# Patient Record
Sex: FEMALE | Race: WHITE | Employment: OTHER | URBAN - METROPOLITAN AREA
[De-identification: names, ages, dates, MRNs, and addresses within clinical notes are randomized per-mention and may not be internally consistent; named-entity substitution may affect disease eponyms.]

---

## 2017-01-19 RX ORDER — BUPROPION HYDROCHLORIDE 300 MG/1
300 TABLET ORAL
Qty: 90 TAB | Refills: 1 | Status: SHIPPED | OUTPATIENT
Start: 2017-01-19 | End: 2017-08-07 | Stop reason: SDUPTHER

## 2017-01-19 NOTE — TELEPHONE ENCOUNTER
63year old patient last AE on 08/08/16 calling to see if her prescription for Wellbutrin can be sent to [facility] delivery. Will send prescription per MD order.

## 2017-03-27 ENCOUNTER — TELEPHONE (OUTPATIENT)
Dept: OBGYN CLINIC | Age: 62
End: 2017-03-27

## 2017-03-27 RX ORDER — ESTRADIOL AND NORETHINDRONE ACETATE 1; .5 MG/1; MG/1
TABLET ORAL
Qty: 84 TAB | Refills: 3 | Status: SHIPPED | OUTPATIENT
Start: 2017-03-27 | End: 2017-08-21 | Stop reason: SDUPTHER

## 2017-05-10 ENCOUNTER — TELEPHONE (OUTPATIENT)
Dept: OBGYN CLINIC | Age: 62
End: 2017-05-10

## 2017-05-10 RX ORDER — ESTRADIOL 0.1 MG/G
CREAM VAGINAL
Qty: 42.5 G | Refills: 2 | Status: SHIPPED | OUTPATIENT
Start: 2017-05-10 | End: 2017-05-12 | Stop reason: SDUPTHER

## 2017-05-10 RX ORDER — HALOBETASOL PROPIONATE 0.5 MG/G
CREAM TOPICAL 2 TIMES DAILY
Qty: 50 G | Refills: 3 | Status: SHIPPED | OUTPATIENT
Start: 2017-05-10 | End: 2018-05-22

## 2017-05-10 NOTE — TELEPHONE ENCOUNTER
Patient called requesting a refill on the Halobetasol and Estrace until her AE 8/2017. Requested the Estrace to be sent to the [facility] pharmacy and the [facility] sent to [facility] mail order.

## 2017-05-12 RX ORDER — ESTRADIOL 0.1 MG/G
CREAM VAGINAL
Qty: 42.5 G | Refills: 2 | Status: SHIPPED | OUTPATIENT
Start: 2017-05-12 | End: 2018-05-22

## 2017-05-12 NOTE — TELEPHONE ENCOUNTER
Call received at 9:39am      63year old patient last seen in the office on 8/8/16 for AE. Patient calling regarding her prescription sent for Estrace 0.01% vaginal cream. Patient was advised that prescription was sent to [address]. Patient would like the prescription resent to [facility] preferred [facility] [address] . This nurse confirmed prescription for Ultavate sent to mail order pharmacy as requested and patient advised that she has already received notification that it is being processed. Prescription for Estrace resent as per MD order to patient preferred pharmacy. Patient verbalized understanding

## 2017-06-06 ENCOUNTER — OFFICE VISIT (OUTPATIENT)
Dept: OBGYN CLINIC | Age: 62
End: 2017-06-06

## 2017-06-06 VITALS
BODY MASS INDEX: 25.07 KG/M2 | DIASTOLIC BLOOD PRESSURE: 84 MMHG | WEIGHT: 156 LBS | SYSTOLIC BLOOD PRESSURE: 140 MMHG | HEIGHT: 66 IN

## 2017-06-06 DIAGNOSIS — N95.1 MENOPAUSAL SYNDROME: ICD-10-CM

## 2017-06-06 DIAGNOSIS — L90.0 LICHEN SCLEROSUS ET ATROPHICUS: ICD-10-CM

## 2017-06-06 DIAGNOSIS — N95.2 VAGINAL ATROPHY: ICD-10-CM

## 2017-06-06 DIAGNOSIS — N76.0 VAGINITIS AND VULVOVAGINITIS: Primary | ICD-10-CM

## 2017-06-06 RX ORDER — FLUCONAZOLE 200 MG/1
200 TABLET ORAL
Qty: 6 TAB | Refills: 1 | Status: SHIPPED | OUTPATIENT
Start: 2017-06-06 | End: 2017-06-11

## 2017-06-06 NOTE — PROGRESS NOTES
Vaginitis evaluation    Chief Complaint   Vaginal Itching      HPI  63 y.o. female complains of itching,irritation and burnging for several days. Patient's last menstrual period was 10/18/2007. She denies additional symptoms at this time. The patient denies aggravating factors. She is not concerned about possible STI exposure at this time. She denies exposure to new chemicals ot hygenic agents except Zithromax about 6-8 weeks ago.   Previous treatment included: none    Past Medical History:   Diagnosis Date    Atypical ductal hyperplasia of breast 2012    Atypical ductal hyperplasia of breast     Colitis     Depression     GERD (gastroesophageal reflux disease) past week    reflux only occasionally    Hormone replacement therapy     Hx of bone density study 2008    Normal per pt    Hypertension     IBS (irritable bowel syndrome)     Lichen sclerosus et atrophicus of the vulva     Menopausal syndrome     Neck pain, chronic     Other ill-defined conditions     old neck injury/pain comes & goes    Other ill-defined conditions     seasonal allergies    Psychiatric disorder     depression    Seasonal allergies     Unspecified adverse effect of anesthesia      hard time waking up with one SX    Unspecified adverse effect of anesthesia     C/O \"waking up\" during one SX    Urinary incontinence      Past Surgical History:   Procedure Laterality Date    BIOPSY OF BREAST, INCISIONAL  9/2012    RIGHT for ADH    BREAST SURGERY PROCEDURE UNLISTED  6/13/11    RIGHT ductal excision    HX CATARACT REMOVAL      2005, 2010    HX GYN  1994    PROCEDURE FOR LICHEN SCLEROSIS    HX GYN      Vulvoplasty    HX HEENT      LASIK    HX HEENT      Eye surgery for lazy eyes    HX HYSTEROSCOPY WITH ENDOMETRIAL ABLATION  2007    w/ D&C    HX ORTHOPAEDIC  1988    Arthroscopy of the knee LEFT    HX ORTHOPAEDIC  3/2011    Bone spur RIGHT FOOT    HX TONSILLECTOMY  1963    HX UROLOGICAL  10/25/13    Urodynamics Social History     Occupational History    Not on file. Social History Main Topics    Smoking status: Never Smoker    Smokeless tobacco: Never Used    Alcohol use 2.0 oz/week     2 Glasses of wine, 2 Cans of beer per week      Comment: does not drink every day just occ    Drug use: No    Sexual activity: Not Currently     Birth control/ protection: None     Family History   Problem Relation Age of Onset    Breast Cancer Maternal Grandmother 36      of \"bone cancer\" many yrs later   [facility] Cancer Mother      LIVER    Headache Mother     Heart Disease Father     Osteoporosis Sister     Uterine Cancer Other      Aunt        Allergies   Allergen Reactions    Demerol [Meperidine] Other (comments)     Arm swelling     Prior to Admission medications    Medication Sig Start Date End Date Taking? Authorizing Provider   estradiol (ESTRACE) 0.01 % (0.1 mg/gram) vaginal cream Insert 0.5g into vagina daily. 17   Sandra Randle MD   halobetasol (ULTRAVATE) 0.05 % topical cream Apply  to affected area two (2) times a day. use thin layer 5/10/17   Sandra Randle MD   estradiol-norethindrone [facility]) 1-0.5 mg per tablet TAKE 1 TABLET BY MOUTH DAILY 3/27/17   Sandra Randle MD   buPROPion XL (WELLBUTRIN XL) 300 mg XL tablet Take 1 Tab by mouth every morning. 17   Sandra Randle MD   conjugated estrogens (PREMARIN) 0.625 mg/gram vaginal cream Insert 0.5 g into vagina daily. 16   Sandra Randle MD   halobetasol (ULTRAVATE) 0.05 % topical cream use thin layer twice a day 16   Sandra Randle MD   conjugated estrogens (PREMARIN) 0.625 mg/gram vaginal cream Insert 0.5 grams vaginally twice a week 16   Sandra Randle MD   VITAMIN D2 50,000 unit capsule  5/21/15   Historical Provider   mirabegron ER (MYRBETRIQ) 50 mg ER tablet Take 1 Tab by mouth daily. 8/17/15   Sandra Randle MD   solifenacin (VESICARE) 10 mg tablet Take 1 Tab by mouth daily.  14   Sandra Randle MD   tolterodine ER (DETROL LA) 4 mg ER capsule Take 1 Cap by mouth daily. 6/2/14   Juanita Aragon MD   FLUTICASONE PROPIONATE, BULK, by Does Not Apply route. Historical Provider   amoxicillin (AMOXIL) 875 mg tablet Take 875 mg by mouth two (2) times a day. Historical Provider   guaiFENesin-codeine (CHERATUSSIN AC)  mg/5 mL solution Take 5 mL by mouth three (3) times daily as needed for Cough. Historical Provider   HYDROcodone-acetaminophen (LORTAB 7.5) 7.5-500 mg per tablet Take 1 Tab by mouth every four (4) hours as needed for Pain. 12/19/13   Juanita Aragon MD   cetirizine (ZYRTEC) 10 mg tablet Take 10 mg by mouth nightly. Historical Provider   amLODIPine (NORVASC) 5 mg tablet Take 5 mg by mouth daily. Historical Provider   dicyclomine (BENTYL) 20 mg tablet Take 20 mg by mouth three (3) times daily as needed. Historical Provider   cyclobenzaprine (FLEXERIL) 10 mg tablet Take 10 mg by mouth three (3) times daily as needed.       Historical Provider                      Review of Systems - History obtained from the patient  Constitutional: negative for weight loss, fever, night sweats  Breast: negative for breast lumps, nipple discharge, galactorrhea  GI: negative for change in bowel habits, abdominal pain, black or bloody stools  : negative for frequency, dysuria, hematuria  MSK: negative for back pain, joint pain, muscle pain  Skin: negative for itching, rash, hives  Neuro: negative for dizziness, headache, confusion, weakness  Psych: negative for anxiety, depression, change in mood  Heme/lymph: negative for bleeding, bruising, pallor       Objective:    Visit Vitals    /84    Ht 5' 6\" (1.676 m)    Wt 156 lb (70.8 kg)    LMP 10/18/2007    BMI 25.18 kg/m2       Physical Exam:   PHYSICAL EXAMINATION    Constitutional  · Appearance: well-nourished, well developed, alert, in no acute distress    HENT  · Head and Face: appears normal    Genitourinary  · External Genitalia: scarred from previous surgery with some LSA changes and some atrophy, possibly worse geovanni-clitoral, some discharge present and adherent to the vulva, no tenderness present, no other inflammatory lesions present  · Vagina:  Some white discharge present, otherwise normal vaginal vault without central or paravaginal defects, no inflammatory lesions present, no masses present  · Bladder: non-tender to palpation  · Urethra: appears normal  · Perineum: scarred but no rashes or skin lesions present  · Anus: anus within normal limits, no hemorrhoids present  · Inguinal Lymph Nodes: no lymphadenopathy present    Skin  · General Inspection: no rash, no lesions identified    Neurologic/Psychiatric  · Mental Status:  · Orientation: grossly oriented to person, place and time  · Mood and Affect: mood normal, affect appropriate       . No results found for any visits on 06/06/17. Assessment:   Suspect monilia vaginitis in addition to LSA and atrophy    Plan:   Treatment: Diflucan every other day for 6 doses. Call results of NS    ROV prn if symptoms persist or worsen.

## 2017-06-09 LAB
A VAGINAE DNA VAG QL NAA+PROBE: ABNORMAL SCORE
BVAB2 DNA VAG QL NAA+PROBE: ABNORMAL SCORE
C ALBICANS DNA VAG QL NAA+PROBE: POSITIVE
C GLABRATA DNA VAG QL NAA+PROBE: NEGATIVE
MEGA1 DNA VAG QL NAA+PROBE: ABNORMAL SCORE

## 2017-08-15 ENCOUNTER — TELEPHONE (OUTPATIENT)
Dept: OBGYN CLINIC | Age: 62
End: 2017-08-15

## 2017-08-15 RX ORDER — BUPROPION HYDROCHLORIDE 300 MG/1
300 TABLET ORAL
Qty: 30 TAB | Refills: 0 | Status: SHIPPED | OUTPATIENT
Start: 2017-08-15

## 2017-08-15 NOTE — TELEPHONE ENCOUNTER
Patient calling stating that a 30 day supply was sent to her mail order pharmacy and they will not refill the medication unless its a 90 day supply. Patient asked that since her AE scheduled for 08/21/2017, could she get a 30 day supply to a local pharmacy just to get her to her AE and then a 90 day supply to her mail order pharmacy when she comes in for her appt. Patient aware that this nurse sent a 30 day supply to her local pharmacy.

## 2017-08-21 ENCOUNTER — OFFICE VISIT (OUTPATIENT)
Dept: OBGYN CLINIC | Age: 62
End: 2017-08-21

## 2017-08-21 VITALS
SYSTOLIC BLOOD PRESSURE: 134 MMHG | HEIGHT: 66 IN | BODY MASS INDEX: 26.2 KG/M2 | WEIGHT: 163 LBS | DIASTOLIC BLOOD PRESSURE: 82 MMHG

## 2017-08-21 DIAGNOSIS — Z79.890 HORMONE REPLACEMENT THERAPY: ICD-10-CM

## 2017-08-21 DIAGNOSIS — R39.15 URINARY URGENCY: ICD-10-CM

## 2017-08-21 DIAGNOSIS — N95.1 MENOPAUSAL SYNDROME: Primary | ICD-10-CM

## 2017-08-21 DIAGNOSIS — L90.0 LICHEN SCLEROSUS ET ATROPHICUS: ICD-10-CM

## 2017-08-21 DIAGNOSIS — Z01.411 ENCOUNTER FOR GYNECOLOGICAL EXAMINATION WITH ABNORMAL FINDING: ICD-10-CM

## 2017-08-21 RX ORDER — ESTRADIOL AND NORETHINDRONE ACETATE 1; .5 MG/1; MG/1
TABLET ORAL
Qty: 168 TAB | Refills: 3 | Status: SHIPPED | OUTPATIENT
Start: 2017-08-21 | End: 2017-10-31 | Stop reason: SDUPTHER

## 2017-08-21 NOTE — PROGRESS NOTES
Annual exam/Problem visit/ ages 40-58    Deven Glynn is a ,  63 y.o. female [facility] Patient's last menstrual period was 10/18/2007. .    She presents for her annual checkup. She is having some significant problems. With regard to the Gardasil vaccine, she is older than the age for which it is FDA approved. Menstrual status:    Her periods are absent in flow. She is using essentially none pads or tampons per day, nonexistant due to post menopausal..    She denies dysmenorrhea. She reports no premenstrual symptoms. Contraception:    The current method of family planning is post menopausal status. Sexual history:    She  reports that she does not currently engage in sexual activity. She reports using the following method of birth control/protection: None. Medical conditions:    Since her last annual GYN exam about one year ago, she has not the following changes in her health history: none. Pap and Mammogram History:    Her most recent Pap smear was normal, obtained 1 year(s) ago. The patient had her mammogram today in our office. Breast Cancer History/Substance Abuse: negative    Osteoporosis History:    Family history does not include a first or second degree relative with osteopenia or osteoporosis. A bone density scan was obtained 9 years ago and revealed negative. She is currently taking calcium and vit D.     Past Medical History:   Diagnosis Date    Atypical ductal hyperplasia of breast 2012    Atypical ductal hyperplasia of breast     Colitis     Depression     GERD (gastroesophageal reflux disease) past week    reflux only occasionally    Hormone replacement therapy     Hx of bone density study     Normal per pt    Hypertension     IBS (irritable bowel syndrome)     Lichen sclerosus et atrophicus of the vulva     Menopausal syndrome     Neck pain, chronic     Other ill-defined conditions     old neck injury/pain comes & goes   [place] ill-defined conditions     seasonal allergies    Psychiatric disorder     depression    Seasonal allergies     Unspecified adverse effect of anesthesia      hard time waking up with one SX    Unspecified adverse effect of anesthesia     C/O \"waking up\" during one SX    Urinary incontinence      Past Surgical History:   Procedure Laterality Date    BIOPSY OF BREAST, INCISIONAL  9/2012    RIGHT for ADH    BREAST SURGERY PROCEDURE UNLISTED  6/13/11    RIGHT ductal excision    HX CATARACT REMOVAL      2005, 2010    HX GYN  1994    PROCEDURE FOR LICHEN SCLEROSIS    HX GYN      Vulvoplasty    HX HEENT      LASIK    HX HEENT      Eye surgery for lazy eyes    HX HYSTEROSCOPY WITH ENDOMETRIAL ABLATION  2007    w/ D&C    HX ORTHOPAEDIC  1988    Arthroscopy of the knee LEFT    HX ORTHOPAEDIC  3/2011    Bone spur RIGHT FOOT    HX TONSILLECTOMY  1963    HX UROLOGICAL  10/25/13    Urodynamics       Current Outpatient Prescriptions   Medication Sig Dispense Refill    buPROPion XL (WELLBUTRIN XL) 300 mg XL tablet Take 1 Tab by mouth every morning. 30 Tab 0    estradiol-norethindrone (LOPREEZA) 1-0.5 mg per tablet TAKE 1 TABLET BY MOUTH DAILY 84 Tab 3    conjugated estrogens (PREMARIN) 0.625 mg/gram vaginal cream Insert 0.5 g into vagina daily. 45 g 0    conjugated estrogens (PREMARIN) 0.625 mg/gram vaginal cream Insert 0.5 grams vaginally twice a week 45 g 3    mirabegron ER (MYRBETRIQ) 50 mg ER tablet Take 1 Tab by mouth daily. 90 Tab 3    tolterodine ER (DETROL LA) 4 mg ER capsule Take 1 Cap by mouth daily. 30 Cap 3    amLODIPine (NORVASC) 5 mg tablet Take 5 mg by mouth daily.  cyclobenzaprine (FLEXERIL) 10 mg tablet Take 10 mg by mouth three (3) times daily as needed.  estradiol (ESTRACE) 0.01 % (0.1 mg/gram) vaginal cream Insert 0.5g into vagina daily. 42.5 g 2    halobetasol (ULTRAVATE) 0.05 % topical cream Apply  to affected area two (2) times a day.  use thin layer 50 g 3    halobetasol (ULTRAVATE) 0.05 % topical cream use thin layer twice a day 50 g 3    VITAMIN D2 50,000 unit capsule       solifenacin (VESICARE) 10 mg tablet Take 1 Tab by mouth daily. 90 Tab 3    FLUTICASONE PROPIONATE, BULK, by Does Not Apply route.  amoxicillin (AMOXIL) 875 mg tablet Take 875 mg by mouth two (2) times a day.  guaiFENesin-codeine (CHERATUSSIN AC)  mg/5 mL solution Take 5 mL by mouth three (3) times daily as needed for Cough.  HYDROcodone-acetaminophen (LORTAB 7.5) 7.5-500 mg per tablet Take 1 Tab by mouth every four (4) hours as needed for Pain. 16 Tab 0    cetirizine (ZYRTEC) 10 mg tablet Take 10 mg by mouth nightly.  dicyclomine (BENTYL) 20 mg tablet Take 20 mg by mouth three (3) times daily as needed. Allergies: Demerol [meperidine]     Tobacco History:  reports that she has never smoked. She has never used smokeless tobacco.  Alcohol Abuse:  reports that she drinks about 2.0 oz of alcohol per week   Drug Abuse:  reports that she does not use illicit drugs.     Family Medical/Cancer History:   Family History   Problem Relation Age of Onset    Breast Cancer Maternal Grandmother 36      of \"bone cancer\" many yrs later   [employer] Cancer Mother      LIVER    Headache Mother     Heart Disease Father     Osteoporosis Sister     Uterine Cancer Other      Aunt        Review of Systems - History obtained from the patient  Constitutional: negative for weight loss, fever, night sweats  HEENT: negative for hearing loss, earache, congestion, snoring, sorethroat  CV: negative for chest pain, palpitations, edema  Resp: negative for cough, shortness of breath, wheezing  GI: negative for change in bowel habits, abdominal pain, black or bloody stools  : negative for frequency, dysuria, hematuria, vaginal discharge  MSK: negative for back pain, joint pain, muscle pain  Breast: negative for breast lumps, nipple discharge, galactorrhea  Skin :negative for itching, rash, hives  Neuro: negative for dizziness, headache, confusion, weakness  Psych: negative for anxiety, depression, change in mood  Heme/lymph: negative for bleeding, bruising, pallor    Physical Exam    Visit Vitals    /82    Ht 5' 6\" (1.676 m)    Wt 163 lb (73.9 kg)    LMP 10/18/2007    BMI 26.31 kg/m2       Constitutional  · Appearance: well-nourished, well developed, alert, in no acute distress    HENT  · Head and Face: appears normal    Neck  · Inspection/Palpation: normal appearance, no masses or tenderness  · Lymph Nodes: no lymphadenopathy present  · Thyroid: gland size normal, nontender, no nodules or masses present on palpation    Chest  · Respiratory Effort: breathing unlabored  · Auscultation: normal breath sounds    Cardiovascular  · Heart:  · Auscultation: regular rate and rhythm without murmur    Breasts  · Inspection of Breasts: breasts symmetrical, no skin changes, no discharge present, nipple appearance normal, no skin retraction present  · Palpation of Breasts and Axillae: no masses present on palpation, no breast tenderness  · Axillary Lymph Nodes: no lymphadenopathy present    Gastrointestinal  · Abdominal Examination: abdomen non-tender to palpation, normal bowel sounds, no masses present  · Liver and spleen: no hepatomegaly present, spleen not palpable  · Hernias: no hernias identified    Genitourinary  · External Genitalia: unchanged from previous exam  · Vagina: normal vaginal vault without central or paravaginal defects, no discharge present, no inflammatory lesions present, no masses present  · Bladder: non-tender to palpation  · Urethra: appears normal  · Cervix: normal   · Uterus: normal size, shape and consistency  · Adnexa: no adnexal tenderness present, no adnexal masses present  · Perineum: perineum within normal limits, no evidence of trauma, no rashes or skin lesions present  · Anus: anus within normal limits, no hemorrhoids present  · Inguinal Lymph Nodes: no lymphadenopathy present    Skin  · General Inspection: no rash, no lesions identified    Neurologic/Psychiatric  · Mental Status:  · Orientation: grossly oriented to person, place and time  · Mood and Affect: mood normal, affect appropriate    Assessment:  Routine gynecologic examination  Her overall medical status is satisfactory except for gynecologic issues as below. Plan:  Counseled re: diet, exercise, healthy lifestyle  Return for yearly wellness visits  Rec annual mammogram    Problem visit    Subjective:  Hot flashes on ERT. Oral combination not working as well as it was (generic)  Bladder frequency better with Myrbetriq, not normal.  But has high fluid intake. Depression on two antidepressants with no sex drive. Xanax for insomnia. Objective:  See above    Assessment:  Vasomotor symptoms--will try doubling her ERT  Bladder stable  Sexual dysfunction    Plan:  Refill ERT with 2 per day.

## 2017-08-21 NOTE — PATIENT INSTRUCTIONS
Well Visit, Women 48 to 72: Care Instructions  Your Care Instructions  Physical exams can help you stay healthy. Your doctor has checked your overall health and may have suggested ways to take good care of yourself. He or she also may have recommended tests. At home, you can help prevent illness with healthy eating, regular exercise, and other steps. Follow-up care is a key part of your treatment and safety. Be sure to make and go to all appointments, and call your doctor if you are having problems. It's also a good idea to know your test results and keep a list of the medicines you take. How can you care for yourself at home? · Reach and stay at a healthy weight. This will lower your risk for many problems, such as obesity, diabetes, heart disease, and high blood pressure. · Get at least 30 minutes of exercise on most days of the week. Walking is a good choice. You also may want to do other activities, such as running, swimming, cycling, or playing tennis or team sports. · Do not smoke. Smoking can make health problems worse. If you need help quitting, talk to your doctor about stop-smoking programs and medicines. These can increase your chances of quitting for good. · Protect your skin from too much sun. When you're outdoors from 10 a.m. to 4 p.m., stay in the shade or cover up with clothing and a hat with a wide brim. Wear sunglasses that block UV rays. Even when it's cloudy, put broad-spectrum sunscreen (SPF 30 or higher) on any exposed skin. · See a dentist one or two times a year for checkups and to have your teeth cleaned. · Wear a seat belt in the car. · Limit alcohol to 1 drink a day. Too much alcohol can cause health problems. Follow your doctor's advice about when to have certain tests. These tests can spot problems early. · Cholesterol.  Your doctor will tell you how often to have this done based on your age, family history, or other things that can increase your risk for heart attack and stroke. · Blood pressure. Have your blood pressure checked during a routine doctor visit. Your doctor will tell you how often to check your blood pressure based on your age, your blood pressure results, and other factors. · Mammogram. Ask your doctor how often you should have a mammogram, which is an X-ray of your breasts. A mammogram can spot breast cancer before it can be felt and when it is easiest to treat. · Pap test and pelvic exam. Ask your doctor how often you should have a Pap test. You may not need to have a Pap test as often as you used to. · Vision. Have your eyes checked every year or two or as often as your doctor suggests. Some experts recommend that you have yearly exams for glaucoma and other age-related eye problems starting at age 48. · Hearing. Tell your doctor if you notice any change in your hearing. You can have tests to find out how well you hear. · Diabetes. Ask your doctor whether you should have tests for diabetes. · Colon cancer. You should begin tests for colon cancer at age 48. You may have one of several tests. Your doctor will tell you how often to have tests based on your age and risk. Risks include whether you already had a precancerous polyp removed from your colon or whether your parents, sisters and brothers, or children have had colon cancer. · Thyroid disease. Talk to your doctor about whether to have your thyroid checked as part of a regular physical exam. Women have an increased chance of a thyroid problem. · Osteoporosis. You should begin tests for bone density at age 72. If you are younger than 72, ask your doctor whether you have factors that may increase your risk for this disease. You may want to have this test before age 72. · Heart attack and stroke risk. At least every 4 to 6 years, you should have your risk for heart attack and stroke assessed.  Your doctor uses factors such as your age, blood pressure, cholesterol, and whether you smoke or have diabetes to show what your risk for a heart attack or stroke is over the next 10 years. When should you call for help? Watch closely for changes in your health, and be sure to contact your doctor if you have any problems or symptoms that concern you. Where can you learn more? Go to http://sena-ramya.info/. Enter S063 in the search box to learn more about \"Well Visit, Women 50 to 72: Care Instructions. \"  Current as of: July 19, 2016  Content Version: 11.3  © 6479-7277 [employer]. Care instructions adapted under license by [employer] (which disclaims liability or warranty for this information). If you have questions about a medical condition or this instruction, always ask your healthcare professional. [address] any warranty or liability for your use of this information.

## 2017-11-01 RX ORDER — ESTRADIOL AND NORETHINDRONE ACETATE 1; .5 MG/1; MG/1
TABLET ORAL
Qty: 168 TAB | Refills: 3 | Status: SHIPPED | OUTPATIENT
Start: 2017-11-01 | End: 2018-05-22

## 2017-11-09 ENCOUNTER — TELEPHONE (OUTPATIENT)
Dept: OBGYN CLINIC | Age: 62
End: 2017-11-09

## 2017-11-09 NOTE — TELEPHONE ENCOUNTER
Naina Hubbard with [facility] order pharmacy calling stating that the rx for Guru Barahona OCP directions state to take 2 tablets daily and the mail order pharmacy will not refill it until the rx is clarified with the MD.    Please advise if patient needs to be taking 2 tablets instead of 1.

## 2018-05-22 ENCOUNTER — HOSPITAL ENCOUNTER (EMERGENCY)
Age: 63
Discharge: HOME | End: 2018-05-22
Attending: EMERGENCY MEDICINE
Payer: COMMERCIAL

## 2018-05-22 ENCOUNTER — APPOINTMENT (OUTPATIENT)
Dept: GENERAL RADIOLOGY | Age: 63
End: 2018-05-22
Attending: EMERGENCY MEDICINE
Payer: COMMERCIAL

## 2018-05-22 VITALS
OXYGEN SATURATION: 98 % | RESPIRATION RATE: 18 BRPM | TEMPERATURE: 98.6 F | BODY MASS INDEX: 26.22 KG/M2 | SYSTOLIC BLOOD PRESSURE: 124 MMHG | DIASTOLIC BLOOD PRESSURE: 73 MMHG | WEIGHT: 163.14 LBS | HEART RATE: 81 BPM | HEIGHT: 66 IN

## 2018-05-22 DIAGNOSIS — R11.2 NAUSEA AND VOMITING, INTRACTABILITY OF VOMITING NOT SPECIFIED, UNSPECIFIED VOMITING TYPE: Primary | ICD-10-CM

## 2018-05-22 LAB
ALBUMIN SERPL-MCNC: 4 G/DL (ref 3.5–5)
ALBUMIN/GLOB SERPL: 1 {RATIO} (ref 1.1–2.2)
ALP SERPL-CCNC: 65 U/L (ref 45–117)
ALT SERPL-CCNC: 30 U/L (ref 12–78)
ANION GAP SERPL CALC-SCNC: 12 MMOL/L (ref 5–15)
APPEARANCE UR: CLEAR
AST SERPL-CCNC: 20 U/L (ref 15–37)
BACTERIA URNS QL MICRO: NEGATIVE /HPF
BASOPHILS # BLD: 0 K/UL (ref 0–0.1)
BASOPHILS NFR BLD: 0 % (ref 0–1)
BILIRUB SERPL-MCNC: 0.7 MG/DL (ref 0.2–1)
BILIRUB UR QL: NEGATIVE
BUN SERPL-MCNC: 16 MG/DL (ref 6–20)
BUN/CREAT SERPL: 16 (ref 12–20)
CALCIUM SERPL-MCNC: 8.7 MG/DL (ref 8.5–10.1)
CHLORIDE SERPL-SCNC: 104 MMOL/L (ref 97–108)
CO2 SERPL-SCNC: 26 MMOL/L (ref 21–32)
COLOR UR: ABNORMAL
CREAT SERPL-MCNC: 1.01 MG/DL (ref 0.55–1.02)
DIFFERENTIAL METHOD BLD: ABNORMAL
EOSINOPHIL # BLD: 0.5 K/UL (ref 0–0.4)
EOSINOPHIL NFR BLD: 5 % (ref 0–7)
EPITH CASTS URNS QL MICRO: ABNORMAL /LPF
ERYTHROCYTE [DISTWIDTH] IN BLOOD BY AUTOMATED COUNT: 13.2 % (ref 11.5–14.5)
GLOBULIN SER CALC-MCNC: 3.9 G/DL (ref 2–4)
GLUCOSE SERPL-MCNC: 93 MG/DL (ref 65–100)
GLUCOSE UR STRIP.AUTO-MCNC: NEGATIVE MG/DL
HCT VFR BLD AUTO: 43.8 % (ref 35–47)
HGB BLD-MCNC: 14.8 G/DL (ref 11.5–16)
HGB UR QL STRIP: NEGATIVE
HYALINE CASTS URNS QL MICRO: ABNORMAL /LPF (ref 0–5)
KETONES UR QL STRIP.AUTO: 15 MG/DL
LEUKOCYTE ESTERASE UR QL STRIP.AUTO: ABNORMAL
LIPASE SERPL-CCNC: 129 U/L (ref 73–393)
LYMPHOCYTES # BLD: 2.2 K/UL (ref 0.8–3.5)
LYMPHOCYTES NFR BLD: 21 % (ref 12–49)
MCH RBC QN AUTO: 29.5 PG (ref 26–34)
MCHC RBC AUTO-ENTMCNC: 33.8 G/DL (ref 30–36.5)
MCV RBC AUTO: 87.4 FL (ref 80–99)
MONOCYTES # BLD: 0.7 K/UL (ref 0–1)
MONOCYTES NFR BLD: 7 % (ref 5–13)
MUCOUS THREADS URNS QL MICRO: ABNORMAL /LPF
NEUTS SEG # BLD: 7.1 K/UL (ref 1.8–8)
NEUTS SEG NFR BLD: 67 % (ref 32–75)
NITRITE UR QL STRIP.AUTO: NEGATIVE
PH UR STRIP: 6.5 [PH] (ref 5–8)
PLATELET # BLD AUTO: 290 K/UL (ref 150–400)
PMV BLD AUTO: 9 FL (ref 8.9–12.9)
POTASSIUM SERPL-SCNC: 3.5 MMOL/L (ref 3.5–5.1)
PROT SERPL-MCNC: 7.9 G/DL (ref 6.4–8.2)
PROT UR STRIP-MCNC: NEGATIVE MG/DL
RBC # BLD AUTO: 5.01 M/UL (ref 3.8–5.2)
RBC #/AREA URNS HPF: ABNORMAL /HPF (ref 0–5)
SODIUM SERPL-SCNC: 142 MMOL/L (ref 136–145)
SP GR UR REFRACTOMETRY: 1.01 (ref 1–1.03)
UA: UC IF INDICATED,UAUC: ABNORMAL
UROBILINOGEN UR QL STRIP.AUTO: 0.2 EU/DL (ref 0.2–1)
WBC # BLD AUTO: 10.5 K/UL (ref 3.6–11)
WBC URNS QL MICRO: ABNORMAL /HPF (ref 0–4)
XXWBCSUS: 0

## 2018-05-22 PROCEDURE — 74011250636 HC RX REV CODE- 250/636: Performed by: EMERGENCY MEDICINE

## 2018-05-22 PROCEDURE — 96374 THER/PROPH/DIAG INJ IV PUSH: CPT

## 2018-05-22 PROCEDURE — 81001 URINALYSIS AUTO W/SCOPE: CPT | Performed by: EMERGENCY MEDICINE

## 2018-05-22 PROCEDURE — 85025 COMPLETE CBC W/AUTO DIFF WBC: CPT | Performed by: EMERGENCY MEDICINE

## 2018-05-22 PROCEDURE — 36415 COLL VENOUS BLD VENIPUNCTURE: CPT | Performed by: EMERGENCY MEDICINE

## 2018-05-22 PROCEDURE — 83690 ASSAY OF LIPASE: CPT | Performed by: EMERGENCY MEDICINE

## 2018-05-22 PROCEDURE — 80053 COMPREHEN METABOLIC PANEL: CPT | Performed by: EMERGENCY MEDICINE

## 2018-05-22 PROCEDURE — 74019 RADEX ABDOMEN 2 VIEWS: CPT

## 2018-05-22 PROCEDURE — 96361 HYDRATE IV INFUSION ADD-ON: CPT

## 2018-05-22 PROCEDURE — 99284 EMERGENCY DEPT VISIT MOD MDM: CPT

## 2018-05-22 RX ORDER — PROMETHAZINE HYDROCHLORIDE 25 MG/1
25 TABLET ORAL
Qty: 12 TAB | Refills: 0 | Status: SHIPPED | OUTPATIENT
Start: 2018-05-22

## 2018-05-22 RX ORDER — ESTRADIOL AND NORETHINDRONE ACETATE .5; .1 MG/1; MG/1
1 TABLET ORAL DAILY
COMMUNITY
End: 2018-08-14 | Stop reason: SDUPTHER

## 2018-05-22 RX ORDER — ONDANSETRON 2 MG/ML
4 INJECTION INTRAMUSCULAR; INTRAVENOUS
Status: COMPLETED | OUTPATIENT
Start: 2018-05-22 | End: 2018-05-22

## 2018-05-22 RX ORDER — ALPRAZOLAM 0.5 MG/1
TABLET ORAL
COMMUNITY

## 2018-05-22 RX ADMIN — SODIUM CHLORIDE 1000 ML: 900 INJECTION, SOLUTION INTRAVENOUS at 10:27

## 2018-05-22 RX ADMIN — ONDANSETRON 4 MG: 2 INJECTION INTRAMUSCULAR; INTRAVENOUS at 10:27

## 2018-05-22 NOTE — ED NOTES
The patient was discharged home by Dr. Edi Duff in stable condition. The patient is alert and oriented, in no respiratory distress and discharge vital signs obtained. The patient's diagnosis, condition and treatment were explained. The patient expressed understanding. One prescription given. No work/school note given. A discharge plan has been developed. A  was not involved in the process. Aftercare instructions were given. Pt ambulatory out of the ED with family.

## 2018-05-22 NOTE — ED PROVIDER NOTES
Patient is a 66 y.o. female presenting with vomiting. Vomiting    Pertinent negatives include no chills, no fever, no abdominal pain, no diarrhea, no headaches, no cough and no headaches. 67 yo WF presents with intermittent vomiting onset Friday. Nonbilious/nonbloody, last vomited yesterday. Called PCP and told to go to ED. Last BM Friday, no bloody or black stools previously. Denies abdominal pain, fever, hematuria. No known sick contacts. Mild dysuria, resolved after drinking cranberry juice.    Past Medical History:   Diagnosis Date    Atypical ductal hyperplasia of breast 2012    Atypical ductal hyperplasia of breast     Colitis     Depression     GERD (gastroesophageal reflux disease) past week    reflux only occasionally    Hormone replacement therapy     Hx of bone density study 2008    Normal per pt    Hypertension     IBS (irritable bowel syndrome)     Lichen sclerosus et atrophicus of the vulva     Menopausal syndrome     Neck pain, chronic     Other ill-defined conditions(799.89)     old neck injury/pain comes & goes    Other ill-defined conditions(799.89)     seasonal allergies    Psychiatric disorder     depression    Seasonal allergies     Unspecified adverse effect of anesthesia      hard time waking up with one SX    Unspecified adverse effect of anesthesia     C/O \"waking up\" during one SX    Urinary incontinence        Past Surgical History:   Procedure Laterality Date    BIOPSY OF BREAST, INCISIONAL  9/2012    RIGHT for ADH    BREAST SURGERY PROCEDURE UNLISTED  6/13/11    RIGHT ductal excision    HX CATARACT REMOVAL      2005, 2010    HX GYN  1994    PROCEDURE FOR LICHEN SCLEROSIS    HX GYN      Vulvoplasty    HX HEENT      LASIK    HX HEENT      Eye surgery for lazy eyes    HX HYSTEROSCOPY WITH ENDOMETRIAL ABLATION  2007    w/ D&C    HX ORTHOPAEDIC  1988    Arthroscopy of the knee LEFT    HX ORTHOPAEDIC  3/2011    Bone spur RIGHT FOOT    [facility] [facility]  HX UROLOGICAL  10/25/13    Urodynamics         Family History:   Problem Relation Age of Onset    Breast Cancer Maternal Grandmother 36      of \"bone cancer\" many yrs later   [facility] Cancer Mother      LIVER    Headache Mother     Heart Disease Father     Osteoporosis Sister     Uterine Cancer Other      Aunt       Social History     Social History    Marital status:      Spouse name: N/A    Number of children: N/A    Years of education: N/A     Occupational History    Not on file. Social History Main Topics    Smoking status: Never Smoker    Smokeless tobacco: Never Used    Alcohol use 2.0 oz/week     2 Glasses of wine, 2 Cans of beer per week      Comment: does not drink every day just occ    Drug use: No    Sexual activity: Not Currently     Birth control/ protection: None     Other Topics Concern    Not on file     Social History Narrative         ALLERGIES: Demerol [meperidine]    Review of Systems   Constitutional: Negative for chills and fever. Respiratory: Negative for cough and shortness of breath. Gastrointestinal: Positive for nausea and vomiting. Negative for abdominal pain, constipation and diarrhea. Genitourinary: Negative for dysuria and hematuria. Musculoskeletal: Negative for neck pain and neck stiffness. Skin: Negative for rash. Neurological: Negative for headaches. All other systems reviewed and are negative.       Vitals:    18 1001 18 1008 18 1010   BP: 158/86 158/86    Pulse:  81    Resp:  18    Temp:  98.6 °F (37 °C)    SpO2:  98% 99%   Weight:  74 kg (163 lb 2.3 oz)    Height:  5' 6\" (1.676 m)             Physical Exam   Physical Examination: General appearance - alert, well appearing, and in no distress, oriented to person, place, and time and normal appearing weight  Eyes - pupils equal and reactive, extraocular eye movements intact  Neck - supple, no significant adenopathy  Chest - clear to auscultation, no wheezes, rales or rhonchi, symmetric air entry  Heart - normal rate, regular rhythm, normal S1, S2, no murmurs, rubs, clicks or gallops  Abdomen - soft, nontender, nondistended, no masses or organomegaly  Back exam - full range of motion, no tenderness, palpable spasm or pain on motion  Neurological - alert, oriented, normal speech, no focal findings or movement disorder noted  Musculoskeletal - no joint tenderness, deformity or swelling  Extremities - peripheral pulses normal, no pedal edema, no clubbing or cyanosis  Skin - normal coloration and turgor, no rashes, no suspicious skin lesions noted  MDM  Number of Diagnoses or Management Options     Amount and/or Complexity of Data Reviewed  Clinical lab tests: ordered and reviewed  Decide to obtain previous medical records or to obtain history from someone other than the patient: yes  Review and summarize past medical records: yes  Independent visualization of images, tracings, or specimens: yes    Patient Progress  Patient progress: improved        ED Course       Procedures  Pt afebrile, nontoxic, VSS, abdomen soft, nontender. No vomiting today. Will d/c with pcp f/u.

## 2018-05-22 NOTE — DISCHARGE INSTRUCTIONS
Nausea and Vomiting: Care Instructions  Your Care Instructions    When you are nauseated, you may feel weak and sweaty and notice a lot of saliva in your mouth. Nausea often leads to vomiting. Most of the time you do not need to worry about nausea and vomiting, but they can be signs of other illnesses. Two common causes of nausea and vomiting are stomach flu and food poisoning. Nausea and vomiting from viral stomach flu will usually start to improve within 24 hours. Nausea and vomiting from food poisoning may last from 12 to 48 hours. The doctor has checked you carefully, but problems can develop later. If you notice any problems or new symptoms, get medical treatment right away. Follow-up care is a key part of your treatment and safety. Be sure to make and go to all appointments, and call your doctor if you are having problems. It's also a good idea to know your test results and keep a list of the medicines you take. How can you care for yourself at home? · To prevent dehydration, drink plenty of fluids, enough so that your urine is light yellow or clear like water. Choose water and other caffeine-free clear liquids until you feel better. If you have kidney, heart, or liver disease and have to limit fluids, talk with your doctor before you increase the amount of fluids you drink. · Rest in bed until you feel better. · When you are able to eat, try clear soups, mild foods, and liquids until all symptoms are gone for 12 to 48 hours. Other good choices include dry toast, crackers, cooked cereal, and gelatin dessert, such as Jell-O. When should you call for help? Call 911 anytime you think you may need emergency care. For example, call if:  ? · You passed out (lost consciousness). ?Call your doctor now or seek immediate medical care if:  ? · You have symptoms of dehydration, such as:  ¨ Dry eyes and a dry mouth. ¨ Passing only a little dark urine.   ¨ Feeling thirstier than usual.   ? · You have new or worsening belly pain. ? · You have a new or higher fever. ? · You vomit blood or what looks like coffee grounds. ? Watch closely for changes in your health, and be sure to contact your doctor if:  ? · You have ongoing nausea and vomiting. ? · Your vomiting is getting worse. ? · Your vomiting lasts longer than 2 days. ? · You are not getting better as expected. Where can you learn more? Go to http://sena-ramya.info/. Enter 25 [id number] in the search box to learn more about \"Nausea and Vomiting: Care Instructions. \"  Current as of: March 20, 2017  Content Version: 11.4  © 3776-4083 [employer]. Care instructions adapted under license by [employer] (which disclaims liability or warranty for this information). If you have questions about a medical condition or this instruction, always ask your healthcare professional. [place] [address] any warranty or liability for your use of this information. We hope that we have addressed all of your medical concerns. The examination and treatment you received in the Emergency Department were for an emergent problem and were not intended as complete care. It is important that you follow up with your healthcare provider(s) for ongoing care. If your symptoms worsen or do not improve as expected, and you are unable to reach your usual health care provider(s), you should return to the Emergency Department. Today's healthcare is undergoing tremendous change, and patient satisfaction surveys are one of the many tools to assess the quality of medical care. You may receive a survey from the [employer] organization regarding your experience in the Emergency Department. I hope that your experience has been completely positive, particularly the medical care that I provided. As such, please participate in the survey; anything less than excellent does not meet my expectations or intentions.         [facility], [facility] and [facility] participate in nationally recognized quality of care measures. If your blood pressure is greater than 120/80, as reported below, we urge that you seek medical care to address the potential of high blood pressure, commonly known as hypertension. Hypertension can be hereditary or can be caused by certain medical conditions, pain, stress, or \"white coat syndrome. \"       Please make an appointment with your health care provider(s) for follow up of your Emergency Department visit. VITALS:   Patient Vitals for the past 8 hrs:   Temp Pulse Resp BP SpO2   05/22/18 1010 - - - - 99 %   05/22/18 1008 98.6 °F (37 °C) 81 18 158/86 98 %   05/22/18 1001 - - - 158/86 -          Thank you for allowing us to provide you with medical care today. We realize that you have many choices for your emergency care needs. Please choose us in the future for any continued health care needs. Jose Antonio Hinson, [facility].   Office: [phone]            Recent Results (from the past 24 hour(s))   CBC WITH AUTOMATED DIFF    Collection Time: 05/22/18 10:06 AM   Result Value Ref Range    WBC 10.5 3.6 - 11.0 K/uL    RBC 5.01 3.80 - 5.20 M/uL    HGB 14.8 11.5 - 16.0 g/dL    HCT 43.8 35.0 - 47.0 %    MCV 87.4 80.0 - 99.0 FL    MCH 29.5 26.0 - 34.0 PG    MCHC 33.8 30.0 - 36.5 g/dL    RDW 13.2 11.5 - 14.5 %    PLATELET 369 524 - 777 K/uL    MPV 9.0 8.9 - 12.9 FL    NEUTROPHILS 67 32 - 75 %    LYMPHOCYTES 21 12 - 49 %    MONOCYTES 7 5 - 13 %    EOSINOPHILS 5 0 - 7 %    BASOPHILS 0 0 - 1 %    ABS. NEUTROPHILS 7.1 1.8 - 8.0 K/UL    ABS. LYMPHOCYTES 2.2 0.8 - 3.5 K/UL    ABS. MONOCYTES 0.7 0.0 - 1.0 K/UL    ABS. EOSINOPHILS 0.5 (H) 0.0 - 0.4 K/UL    ABS.  BASOPHILS 0.0 0.0 - 0.1 K/UL    DF AUTOMATED      XXWBCSUS 0     METABOLIC PANEL, COMPREHENSIVE    Collection Time: 05/22/18 10:06 AM   Result Value Ref Range    Sodium 142 136 - 145 mmol/L    Potassium 3.5 3.5 - 5.1 mmol/L    Chloride 104 97 - 108 mmol/L    CO2 26 21 - 32 mmol/L    Anion gap 12 5 - 15 mmol/L    Glucose 93 65 - 100 mg/dL    BUN 16 6 - 20 MG/DL    Creatinine 1.01 0.55 - 1.02 MG/DL    BUN/Creatinine ratio 16 12 - 20      GFR est AA >60 >60 ml/min/1.73m2    GFR est non-AA 56 (L) >60 ml/min/1.73m2    Calcium 8.7 8.5 - 10.1 MG/DL    Bilirubin, total 0.7 0.2 - 1.0 MG/DL    ALT (SGPT) 30 12 - 78 U/L    AST (SGOT) 20 15 - 37 U/L    Alk. phosphatase 65 45 - 117 U/L    Protein, total 7.9 6.4 - 8.2 g/dL    Albumin 4.0 3.5 - 5.0 g/dL    Globulin 3.9 2.0 - 4.0 g/dL    A-G Ratio 1.0 (L) 1.1 - 2.2     LIPASE    Collection Time: 05/22/18 10:06 AM   Result Value Ref Range    Lipase 129 73 - 393 U/L   URINALYSIS W/ REFLEX CULTURE    Collection Time: 05/22/18 12:05 PM   Result Value Ref Range    Color YELLOW/STRAW      Appearance CLEAR CLEAR      Specific gravity 1.010 1.003 - 1.030      pH (UA) 6.5 5.0 - 8.0      Protein NEGATIVE  NEG mg/dL    Glucose NEGATIVE  NEG mg/dL    Ketone 15 (A) NEG mg/dL    Bilirubin NEGATIVE  NEG      Blood NEGATIVE  NEG      Urobilinogen 0.2 0.2 - 1.0 EU/dL    Nitrites NEGATIVE  NEG      Leukocyte Esterase TRACE (A) NEG      WBC 0-4 0 - 4 /hpf    RBC 0-5 0 - 5 /hpf    Epithelial cells FEW FEW /lpf    Bacteria NEGATIVE  NEG /hpf    UA:UC IF INDICATED CULTURE NOT INDICATED BY UA RESULT CNI      Mucus 1+ (A) NEG /lpf    Hyaline cast 0-2 0 - 5 /lpf       No results found.

## 2018-05-22 NOTE — ED TRIAGE NOTES
Pt ambulatory to treatment area with c/o \"vomiting that started Friday and I vomited through out the weekend and I have not been able to keep anything down. \"  Pt denies abdominal pain but states \"my stomach is very upset. \"  Pt denies diarrhea, urinary symptoms, fevers. Pt denies taking medication for symptoms.

## 2018-05-22 NOTE — ED NOTES
Plan of care and all test/meds ordered explained to pt. Good understanding and agreement with plan was verbalized. IVF infusing to gravity without difficulty. Pt given warm blanket for comfort. Call bell within reach.

## 2018-05-22 NOTE — ED NOTES
Pt reports \"my stomach feels much better, I am not nauseated now. \"  Toileting offered, pt declined at this time. Will continue to monitor.

## 2018-05-25 ENCOUNTER — HOSPITAL ENCOUNTER (EMERGENCY)
Age: 63
Discharge: HOME | End: 2018-05-25
Attending: EMERGENCY MEDICINE
Payer: COMMERCIAL

## 2018-05-25 ENCOUNTER — APPOINTMENT (OUTPATIENT)
Dept: CT IMAGING | Age: 63
End: 2018-05-25
Attending: EMERGENCY MEDICINE
Payer: COMMERCIAL

## 2018-05-25 VITALS
BODY MASS INDEX: 25.58 KG/M2 | DIASTOLIC BLOOD PRESSURE: 81 MMHG | SYSTOLIC BLOOD PRESSURE: 128 MMHG | HEART RATE: 79 BPM | WEIGHT: 159.17 LBS | OXYGEN SATURATION: 97 % | TEMPERATURE: 98.6 F | RESPIRATION RATE: 16 BRPM | HEIGHT: 66 IN

## 2018-05-25 DIAGNOSIS — R11.0 NAUSEA: Primary | ICD-10-CM

## 2018-05-25 LAB
ALBUMIN SERPL-MCNC: 4 G/DL (ref 3.5–5)
ALBUMIN/GLOB SERPL: 1.1 {RATIO} (ref 1.1–2.2)
ALP SERPL-CCNC: 64 U/L (ref 45–117)
ALT SERPL-CCNC: 29 U/L (ref 12–78)
ANION GAP SERPL CALC-SCNC: 12 MMOL/L (ref 5–15)
AST SERPL-CCNC: 18 U/L (ref 15–37)
ATRIAL RATE: 75 BPM
BASOPHILS # BLD: 0 K/UL (ref 0–0.1)
BASOPHILS NFR BLD: 0 % (ref 0–1)
BILIRUB SERPL-MCNC: 0.5 MG/DL (ref 0.2–1)
BUN SERPL-MCNC: 15 MG/DL (ref 6–20)
BUN/CREAT SERPL: 14 (ref 12–20)
CALCIUM SERPL-MCNC: 9.1 MG/DL (ref 8.5–10.1)
CALCULATED P AXIS, ECG09: 25 DEGREES
CALCULATED R AXIS, ECG10: 34 DEGREES
CALCULATED T AXIS, ECG11: 91 DEGREES
CHLORIDE SERPL-SCNC: 103 MMOL/L (ref 97–108)
CO2 SERPL-SCNC: 24 MMOL/L (ref 21–32)
CREAT SERPL-MCNC: 1.06 MG/DL (ref 0.55–1.02)
DIAGNOSIS, 93000: NORMAL
DIFFERENTIAL METHOD BLD: ABNORMAL
EOSINOPHIL # BLD: 0.8 K/UL (ref 0–0.4)
EOSINOPHIL NFR BLD: 8 % (ref 0–7)
ERYTHROCYTE [DISTWIDTH] IN BLOOD BY AUTOMATED COUNT: 13.1 % (ref 11.5–14.5)
GLOBULIN SER CALC-MCNC: 3.6 G/DL (ref 2–4)
GLUCOSE SERPL-MCNC: 98 MG/DL (ref 65–100)
HCT VFR BLD AUTO: 42.9 % (ref 35–47)
HGB BLD-MCNC: 14.8 G/DL (ref 11.5–16)
LYMPHOCYTES # BLD: 2.1 K/UL (ref 0.8–3.5)
LYMPHOCYTES NFR BLD: 20 % (ref 12–49)
MCH RBC QN AUTO: 29.8 PG (ref 26–34)
MCHC RBC AUTO-ENTMCNC: 34.5 G/DL (ref 30–36.5)
MCV RBC AUTO: 86.3 FL (ref 80–99)
MONOCYTES # BLD: 0.7 K/UL (ref 0–1)
MONOCYTES NFR BLD: 7 % (ref 5–13)
NEUTS SEG # BLD: 6.9 K/UL (ref 1.8–8)
NEUTS SEG NFR BLD: 65 % (ref 32–75)
P-R INTERVAL, ECG05: 170 MS
PLATELET # BLD AUTO: 297 K/UL (ref 150–400)
PMV BLD AUTO: 9.3 FL (ref 8.9–12.9)
POTASSIUM SERPL-SCNC: 3.5 MMOL/L (ref 3.5–5.1)
PROT SERPL-MCNC: 7.6 G/DL (ref 6.4–8.2)
Q-T INTERVAL, ECG07: 396 MS
QRS DURATION, ECG06: 68 MS
QTC CALCULATION (BEZET), ECG08: 442 MS
RBC # BLD AUTO: 4.97 M/UL (ref 3.8–5.2)
SODIUM SERPL-SCNC: 139 MMOL/L (ref 136–145)
TROPONIN I BLD-MCNC: <0.04 NG/ML (ref 0–0.08)
VENTRICULAR RATE, ECG03: 75 BPM
WBC # BLD AUTO: 10.6 K/UL (ref 3.6–11)
XXWBCSUS: 0

## 2018-05-25 PROCEDURE — 99283 EMERGENCY DEPT VISIT LOW MDM: CPT

## 2018-05-25 PROCEDURE — 74011250636 HC RX REV CODE- 250/636: Performed by: EMERGENCY MEDICINE

## 2018-05-25 PROCEDURE — 84484 ASSAY OF TROPONIN QUANT: CPT

## 2018-05-25 PROCEDURE — 85025 COMPLETE CBC W/AUTO DIFF WBC: CPT | Performed by: EMERGENCY MEDICINE

## 2018-05-25 PROCEDURE — 80053 COMPREHEN METABOLIC PANEL: CPT | Performed by: EMERGENCY MEDICINE

## 2018-05-25 PROCEDURE — 93005 ELECTROCARDIOGRAM TRACING: CPT

## 2018-05-25 PROCEDURE — 74011636320 HC RX REV CODE- 636/320: Performed by: EMERGENCY MEDICINE

## 2018-05-25 PROCEDURE — 96361 HYDRATE IV INFUSION ADD-ON: CPT

## 2018-05-25 PROCEDURE — 96374 THER/PROPH/DIAG INJ IV PUSH: CPT

## 2018-05-25 PROCEDURE — 36415 COLL VENOUS BLD VENIPUNCTURE: CPT | Performed by: EMERGENCY MEDICINE

## 2018-05-25 PROCEDURE — 74177 CT ABD & PELVIS W/CONTRAST: CPT

## 2018-05-25 RX ORDER — ONDANSETRON 4 MG/1
4 TABLET, FILM COATED ORAL
Qty: 12 TAB | Refills: 0 | Status: SHIPPED | OUTPATIENT
Start: 2018-05-25

## 2018-05-25 RX ORDER — FAMOTIDINE 20 MG/1
20 TABLET, FILM COATED ORAL 2 TIMES DAILY
COMMUNITY

## 2018-05-25 RX ORDER — ONDANSETRON 2 MG/ML
4 INJECTION INTRAMUSCULAR; INTRAVENOUS
Status: COMPLETED | OUTPATIENT
Start: 2018-05-25 | End: 2018-05-25

## 2018-05-25 RX ADMIN — SODIUM CHLORIDE 1000 ML: 900 INJECTION, SOLUTION INTRAVENOUS at 10:22

## 2018-05-25 RX ADMIN — IOPAMIDOL 100 ML: 755 INJECTION, SOLUTION INTRAVENOUS at 11:26

## 2018-05-25 RX ADMIN — ONDANSETRON 4 MG: 2 INJECTION INTRAMUSCULAR; INTRAVENOUS at 10:54

## 2018-05-25 NOTE — ED NOTES
Pt given discharge instructions by Dr Lonnie Shaffer she verbalizes an understanding pt stable at time of discharge ambulates to lobby with sister

## 2018-05-25 NOTE — ED PROVIDER NOTES
Patient is a 66 y.o. female presenting with vomiting. Vomiting         The patient is a 61-year-old white female who presents with acute onset of nausea vomiting since Friday about one week prior to admission she denies any abdominal pain. She was seen here 3 days ago and had negative x-ray and negative laboratory studies done and was sent home. She denies any diarrhea or vertigo there she's had mild intermittent left-sided abdominal pain for some time. This is usually relieved with PPI. She is concerned because her mother presented with similar nausea and developed widespread cancer.   Past Medical History:   Diagnosis Date    Atypical ductal hyperplasia of breast 2012    Atypical ductal hyperplasia of breast     Colitis     Depression     GERD (gastroesophageal reflux disease) past week    reflux only occasionally    Hormone replacement therapy     Hx of bone density study 2008    Normal per pt    Hypertension     IBS (irritable bowel syndrome)     Lichen sclerosus et atrophicus of the vulva     Menopausal syndrome     Neck pain, chronic     Other ill-defined conditions(799.89)     old neck injury/pain comes & goes    Other ill-defined conditions(799.89)     seasonal allergies    Psychiatric disorder     depression    Seasonal allergies     Unspecified adverse effect of anesthesia      hard time waking up with one SX    Unspecified adverse effect of anesthesia     C/O \"waking up\" during one SX    Urinary incontinence        Past Surgical History:   Procedure Laterality Date    BIOPSY OF BREAST, INCISIONAL  9/2012    RIGHT for ADH    BREAST SURGERY PROCEDURE UNLISTED  6/13/11    RIGHT ductal excision    HX CATARACT REMOVAL      2005, 2010    HX GYN  1994    PROCEDURE FOR LICHEN SCLEROSIS    HX GYN      Vulvoplasty    HX HEENT      LASIK    HX HEENT      Eye surgery for lazy eyes    HX HYSTEROSCOPY WITH ENDOMETRIAL ABLATION  2007    w/ D&C    HX ORTHOPAEDIC  1988    Arthroscopy of the knee LEFT    HX ORTHOPAEDIC  3/2011    Bone spur RIGHT FOOT    HX TONSILLECTOMY  1963    HX UROLOGICAL  10/25/13    Urodynamics         Family History:   Problem Relation Age of Onset    Cancer Mother      LIVER    Headache Mother     Heart Disease Father     Breast Cancer Maternal Grandmother 36      of \"bone cancer\" many yrs later   [facility] Osteoporosis Sister     Uterine Cancer Other      Aunt       Social History     Social History    Marital status:      Spouse name: N/A    Number of children: N/A    Years of education: N/A     Occupational History    Not on file. Social History Main Topics    Smoking status: Never Smoker    Smokeless tobacco: Never Used    Alcohol use 2.0 oz/week     2 Glasses of wine, 2 Cans of beer per week      Comment: does not drink every day just occ    Drug use: No    Sexual activity: Not Currently     Birth control/ protection: None     Other Topics Concern    Not on file     Social History Narrative         ALLERGIES: Demerol [meperidine]    Review of Systems   Gastrointestinal: Positive for vomiting. All other systems reviewed and are negative. Vitals:    18 1006   BP: 156/90   Pulse: 91   Resp: 16   Temp: 98.6 °F (37 °C)   SpO2: 95%   Weight: 72.2 kg (159 lb 2.8 oz)   Height: 5' 6\" (1.676 m)            Physical Exam   Constitutional: She is oriented to person, place, and time. She appears well-developed and well-nourished. HENT:   Head: Normocephalic and atraumatic. Mouth/Throat: Oropharynx is clear and moist. No oropharyngeal exudate. Eyes: Conjunctivae are normal. No scleral icterus. Neck: Neck supple. No thyromegaly present. Cardiovascular: Normal rate, regular rhythm and normal heart sounds. Exam reveals no gallop and no friction rub. No murmur heard. Pulmonary/Chest: Effort normal and breath sounds normal. No stridor. No respiratory distress. She has no wheezes. She has no rales. Abdominal: Soft.  Bowel sounds are normal. There is no tenderness. There is no rebound and no guarding. nontender  No rebound   Musculoskeletal: Normal range of motion. Lymphadenopathy:     She has no cervical adenopathy. Neurological: She is alert and oriented to person, place, and time. Skin: Skin is warm and dry. Psychiatric: She has a normal mood and affect. Nursing note and vitals reviewed. [facility]      ED Course       Procedures    ED EKG interpretation:  Rhythm  nsr rate 75  No acute st changesThis EKG was interpreted by Kael Bobby MD,ED Provider.

## 2018-05-25 NOTE — ED TRIAGE NOTES
Pt ambulates to treatment area she states that since she was seen here on Tuesday she has continued with the N/V at home. She took the Phenergan one time but it knocked her out so much she did not use again. She denies any abdominal pain with the N/V. She called her PCP this morning and they told her to come back here for fluids.

## 2018-05-25 NOTE — DISCHARGE INSTRUCTIONS
We hope that we have addressed all of your medical concerns. The examination and treatment you received in the Emergency Department were for an emergent problem and were not intended as complete care. It is important that you follow up with your healthcare provider(s) for ongoing care. If your symptoms worsen or do not improve as expected, and you are unable to reach your usual health care provider(s), you should return to the Emergency Department. Today's healthcare is undergoing tremendous change, and patient satisfaction surveys are one of the many tools to assess the quality of medical care. You may receive a survey from the [employer] organization regarding your experience in the Emergency Department. I hope that your experience has been completely positive, particularly the medical care that I provided. As such, please participate in the survey; anything less than excellent does not meet my expectations or intentions. [facility] and [facility] participate in nationally recognized quality of care measures. If your blood pressure is greater than 120/80, as reported below, we urge that you seek medical care to address the potential of high blood pressure, commonly known as hypertension. Hypertension can be hereditary or can be caused by certain medical conditions, pain, stress, or \"white coat syndrome. \"       Please make an appointment with your health care provider(s) for follow up of your Emergency Department visit. VITALS:   Patient Vitals for the past 8 hrs:   Temp Pulse Resp BP SpO2   05/25/18 1006 98.6 °F (37 °C) 91 16 156/90 95 %          Thank you for allowing us to provide you with medical care today. We realize that you have many choices for your emergency care needs. Please choose us in the future for any continued health care needs. Екатерина Hubbard, [address] [place] [address] [address]   Office: [phone]            Recent Results (from the past 24 hour(s))   CBC WITH AUTOMATED DIFF    Collection Time: 05/25/18 10:23 AM   Result Value Ref Range    WBC 10.6 3.6 - 11.0 K/uL    RBC 4.97 3.80 - 5.20 M/uL    HGB 14.8 11.5 - 16.0 g/dL    HCT 42.9 35.0 - 47.0 %    MCV 86.3 80.0 - 99.0 FL    MCH 29.8 26.0 - 34.0 PG    MCHC 34.5 30.0 - 36.5 g/dL    RDW 13.1 11.5 - 14.5 %    PLATELET 063 497 - 161 K/uL    MPV 9.3 8.9 - 12.9 FL    NRBC PENDING  WBC    ABSOLUTE NRBC PENDING K/uL    NEUTROPHILS 65 32 - 75 %    LYMPHOCYTES 20 12 - 49 %    MONOCYTES 7 5 - 13 %    EOSINOPHILS 8 (H) 0 - 7 %    BASOPHILS 0 0 - 1 %    ABS. NEUTROPHILS 6.9 1.8 - 8.0 K/UL    ABS. LYMPHOCYTES 2.1 0.8 - 3.5 K/UL    ABS. MONOCYTES 0.7 0.0 - 1.0 K/UL    ABS. EOSINOPHILS 0.8 (H) 0.0 - 0.4 K/UL    ABS. BASOPHILS 0.0 0.0 - 0.1 K/UL    DF AUTOMATED      XXWBCSUS 0     METABOLIC PANEL, COMPREHENSIVE    Collection Time: 05/25/18 10:23 AM   Result Value Ref Range    Sodium 139 136 - 145 mmol/L    Potassium 3.5 3.5 - 5.1 mmol/L    Chloride 103 97 - 108 mmol/L    CO2 24 21 - 32 mmol/L    Anion gap 12 5 - 15 mmol/L    Glucose 98 65 - 100 mg/dL    BUN 15 6 - 20 MG/DL    Creatinine 1.06 (H) 0.55 - 1.02 MG/DL    BUN/Creatinine ratio 14 12 - 20      GFR est AA >60 >60 ml/min/1.73m2    GFR est non-AA 53 (L) >60 ml/min/1.73m2    Calcium 9.1 8.5 - 10.1 MG/DL    Bilirubin, total 0.5 0.2 - 1.0 MG/DL    ALT (SGPT) 29 12 - 78 U/L    AST (SGOT) 18 15 - 37 U/L    Alk.  phosphatase 64 45 - 117 U/L    Protein, total 7.6 6.4 - 8.2 g/dL    Albumin 4.0 3.5 - 5.0 g/dL    Globulin 3.6 2.0 - 4.0 g/dL    A-G Ratio 1.1 1.1 - 2.2     EKG, 12 LEAD, INITIAL    Collection Time: 05/25/18 11:58 AM   Result Value Ref Range    Ventricular Rate 75 BPM    Atrial Rate 75 BPM    P-R Interval 170 ms    QRS Duration 68 ms    Q-T Interval 396 ms    QTC Calculation (Bezet) 442 ms    Calculated P Axis 25 degrees    Calculated R Axis 34 degrees    Calculated T Axis 91 degrees    Diagnosis Normal sinus rhythm  Septal infarct , age undetermined  Abnormal ECG  When compared with ECG of 12-DEC-2013 08:39,  No significant change was found     POC TROPONIN-I    Collection Time: 05/25/18 12:05 PM   Result Value Ref Range    Troponin-I (POC) <0.04 0.00 - 0.08 ng/mL       Ct Abd Pelv W Cont    Result Date: 5/25/2018  CT ABDOMEN AND PELVIS WITH CONTRAST. 5/25/2018 11:25 AM INDICATION: Nausea and vomiting since Friday. COMPARISON: None. TECHNIQUE: CT of the abdomen and pelvis was performed after the administration of 100 cc IV Isovue 370. CT dose reduction was achieved through use of a standardized protocol tailored for this examination and automatic exposure control for dose modulation. Adaptive statistical iterative reconstruction (ASIR) was utilized. FINDINGS: Abdomen: Incidental note is made of a tiny periampullary duodenal diverticulum and subcentimeter hypodense bilateral renal lesions which are too small to characterize, but likely represent cysts. The lung bases are clear. The heart size is normal. The distal esophagus, stomach, duodenum, liver, gallbladder, pancreas, spleen, adrenals, and kidneys are otherwise normal. Pelvis: The small bowel, ileocecal junction, appendix, colon, and bladder are normal. No free air or fluid, and no abdominopelvic lymphadenopathy. IMPRESSION: No acute process in the abdomen and pelvis.

## 2018-08-14 ENCOUNTER — OFFICE VISIT (OUTPATIENT)
Dept: OBGYN CLINIC | Age: 63
End: 2018-08-14

## 2018-08-14 VITALS
DIASTOLIC BLOOD PRESSURE: 84 MMHG | WEIGHT: 158 LBS | SYSTOLIC BLOOD PRESSURE: 136 MMHG | BODY MASS INDEX: 25.39 KG/M2 | HEIGHT: 66 IN

## 2018-08-14 DIAGNOSIS — Z12.39 BREAST CANCER SCREENING: ICD-10-CM

## 2018-08-14 DIAGNOSIS — Z13.820 OSTEOPOROSIS SCREENING: ICD-10-CM

## 2018-08-14 DIAGNOSIS — Z01.419 ENCOUNTER FOR GYNECOLOGICAL EXAMINATION WITHOUT ABNORMAL FINDING: Primary | ICD-10-CM

## 2018-08-14 RX ORDER — ESTRADIOL AND NORETHINDRONE ACETATE .5; .1 MG/1; MG/1
1 TABLET ORAL DAILY
Qty: 90 TAB | Refills: 4 | Status: SHIPPED | OUTPATIENT
Start: 2018-08-14 | End: 2019-10-16

## 2018-08-14 RX ORDER — GLUCOSAMINE SULFATE 1500 MG
POWDER IN PACKET (EA) ORAL DAILY
COMMUNITY

## 2018-08-14 NOTE — PROGRESS NOTES
Annual exam ages 40-58    Bonilla Holly is a ,  66 y.o. female [facility] Patient's last menstrual period was 10/18/2007. .    She presents for her annual checkup. She is having bloating and read online this could be ovarian cancer and she is concerned. With regard to the Gardasil vaccine, she is older than the age for which it is FDA approved. Menstrual status:    Her periods are nonexistent in flow due to menopause. She denies dysmenorrhea. She reports no premenstrual symptoms. Contraception:    The current method of family planning is post menopausal status. Sexual history:    She  reports that she does not currently engage in sexual activity but has had male partners.; She reports using the following method of birth control/protection: None. Medical conditions:    Since her last annual GYN exam about two years ago, she has not the following changes in her health history: had episode of N/V--lost 12 #. CT normal.    Pap and Mammogram History:    Her most recent Pap smear was normal, obtained 2 year(s) ago. The patient has not had a recent mammogram.    Breast Cancer History/Substance Abuse: positive breast cancer in maternal grandmother    Osteoporosis History:    Family history does not include a first or second degree relative with osteopenia or osteoporosis. A bone density scan was obtained in  and revealed a normal scan per pt. She is currently taking vit D.     Past Medical History:   Diagnosis Date    Atypical ductal hyperplasia of breast     Atypical ductal hyperplasia of breast     Colitis     Depression     GERD (gastroesophageal reflux disease) past week    reflux only occasionally    Hormone replacement therapy     Hx of bone density study     Normal per pt    Hypertension     IBS (irritable bowel syndrome)     Lichen sclerosus et atrophicus of the vulva     Menopausal syndrome     Neck pain, chronic     Other ill-defined conditions(799.89)     old neck injury/pain comes & goes    Other ill-defined conditions(799.89)     seasonal allergies    Psychiatric disorder     depression    Seasonal allergies     Unspecified adverse effect of anesthesia      hard time waking up with one SX    Unspecified adverse effect of anesthesia     C/O \"waking up\" during one SX    Urinary incontinence      Past Surgical History:   Procedure Laterality Date    BIOPSY OF BREAST, INCISIONAL  9/2012    RIGHT for ADH    BREAST SURGERY PROCEDURE UNLISTED  6/13/11    RIGHT ductal excision    HX CATARACT REMOVAL      2005, 2010    HX GYN  1994    PROCEDURE FOR LICHEN SCLEROSIS    HX GYN      Vulvoplasty    HX HEENT      LASIK    HX HEENT      Eye surgery for lazy eyes    HX HYSTEROSCOPY WITH ENDOMETRIAL ABLATION  2007    w/ D&C    HX ORTHOPAEDIC  1988    Arthroscopy of the knee LEFT    HX ORTHOPAEDIC  3/2011    Bone spur RIGHT FOOT    HX TONSILLECTOMY  1963    HX UROLOGICAL  10/25/13    Urodynamics       Current Outpatient Prescriptions   Medication Sig Dispense Refill    cholecalciferol (VITAMIN D3) 1,000 unit cap Take  by mouth daily.  famotidine (PEPCID) 20 mg tablet Take 20 mg by mouth two (2) times a day.  vortioxetine (TRINTELLIX) 20 mg tablet Take  by mouth daily.  ALPRAZolam (XANAX) 0.5 mg tablet Take  by mouth.  mirabegron ER (MYRBETRIQ) 50 mg ER tablet Take 1 Tab by mouth daily. 90 Tab 3    amLODIPine (NORVASC) 5 mg tablet Take 5 mg by mouth daily.  ondansetron hcl (ZOFRAN) 4 mg tablet Take 1 Tab by mouth every eight (8) hours as needed for Nausea for up to 16 doses. 12 Tab 0    estradiol-norethindrone (AMABELZ) 0.5-0.1 mg per tablet Take 1 Tab by mouth daily.  promethazine (PHENERGAN) 25 mg tablet Take 1 Tab by mouth every six (6) hours as needed for Nausea. 12 Tab 0    buPROPion XL (WELLBUTRIN XL) 300 mg XL tablet Take 1 Tab by mouth every morning.  30 Tab 0     Allergies: Demerol [meperidine] Tobacco History:  reports that she has never smoked. She has never used smokeless tobacco.  Alcohol Abuse:  reports that she drinks about 2.0 oz of alcohol per week   Drug Abuse:  reports that she does not use illicit drugs.     Family Medical/Cancer History:   Family History   Problem Relation Age of Onset    Cancer Mother      LIVER    Headache Mother     Heart Disease Father     Breast Cancer Maternal Grandmother 36      of \"bone cancer\" many yrs later   Bellatrix.Wade Osteoporosis Sister     Uterine Cancer Other      Aunt        Review of Systems - History obtained from the patient  Constitutional: negative for weight loss, fever, night sweats  HEENT: negative for hearing loss, earache, congestion, snoring, sorethroat  CV: negative for chest pain, palpitations, edema  Resp: negative for cough, shortness of breath, wheezing  GI: negative for change in bowel habits, abdominal pain, black or bloody stools  : negative for frequency, dysuria, hematuria, vaginal discharge  MSK: negative for back pain, joint pain, muscle pain  Breast: negative for breast lumps, nipple discharge, galactorrhea  Skin :negative for itching, rash, hives  Neuro: negative for dizziness, headache, confusion, weakness  Psych: negative for anxiety, depression, change in mood  Heme/lymph: negative for bleeding, bruising, pallor    Physical Exam    Visit Vitals    /84    Ht 5' 6\" (1.676 m)    Wt 158 lb (71.7 kg)    LMP 10/18/2007    BMI 25.5 kg/m2       Constitutional  · Appearance: well-nourished, well developed, alert, in no acute distress    HENT  · Head and Face: appears normal    Neck  · Inspection/Palpation: normal appearance, no masses or tenderness  · Lymph Nodes: no lymphadenopathy present  · Thyroid: gland size normal, nontender, no nodules or masses present on palpation    Chest  · Respiratory Effort: breathing unlabored  · Auscultation: normal breath sounds    Cardiovascular  · Heart:  · Auscultation: regular rate and rhythm without murmur    Breasts  · Inspection of Breasts: breasts symmetrical, no skin changes, no discharge present, nipple appearance normal, no skin retraction present  · Palpation of Breasts and Axillae: no masses present on palpation, no breast tenderness  · Axillary Lymph Nodes: no lymphadenopathy present    Gastrointestinal  · Abdominal Examination: abdomen non-tender to palpation, normal bowel sounds, no masses present  · Liver and spleen: no hepatomegaly present, spleen not palpable  · Hernias: no hernias identified    Genitourinary  · External Genitalia: normal appearance for age, no discharge present, no tenderness present, no inflammatory lesions present, no masses present, no atrophy present  · Vagina: normal vaginal vault without central or paravaginal defects, no discharge present, no inflammatory lesions present, no masses present  · Bladder: non-tender to palpation  · Urethra: appears normal  · Cervix: normal   · Uterus: normal size, shape and consistency  · Adnexa: no adnexal tenderness present, no adnexal masses present  · Perineum: perineum within normal limits, no evidence of trauma, no rashes or skin lesions present  · Anus: anus within normal limits, no hemorrhoids present  · Inguinal Lymph Nodes: no lymphadenopathy present    Skin  · General Inspection: no rash, no lesions identified    Neurologic/Psychiatric  · Mental Status:  · Orientation: grossly oriented to person, place and time  · Mood and Affect: mood normal, affect appropriate    Assessment:  Routine gynecologic examination  Her current medical status is satisfactory with no evidence of significant gynecologic issues.   Showed her CT pics of her normal ovaries    Plan:  Counseled re: diet, exercise, healthy lifestyle  Return for yearly wellness visits  Rec annual mammogram

## 2018-10-19 ENCOUNTER — HOSPITAL ENCOUNTER (OUTPATIENT)
Dept: MAMMOGRAPHY | Age: 63
Discharge: HOME | End: 2018-10-19
Attending: OBSTETRICS & GYNECOLOGY
Payer: COMMERCIAL

## 2018-10-19 DIAGNOSIS — Z13.820 OSTEOPOROSIS SCREENING: ICD-10-CM

## 2018-10-19 DIAGNOSIS — Z12.39 BREAST CANCER SCREENING: ICD-10-CM

## 2018-10-19 PROCEDURE — 77080 DXA BONE DENSITY AXIAL: CPT

## 2018-10-19 PROCEDURE — 77063 BREAST TOMOSYNTHESIS BI: CPT

## 2018-12-13 RX ORDER — MIRABEGRON 50 MG/1
TABLET, FILM COATED, EXTENDED RELEASE ORAL
Qty: 90 TAB | Refills: 3 | Status: SHIPPED | OUTPATIENT
Start: 2018-12-13 | End: 2019-10-16

## 2019-10-14 ENCOUNTER — TELEPHONE (OUTPATIENT)
Dept: OBGYN CLINIC | Age: 64
End: 2019-10-14
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
## 2019-10-16 RX ORDER — ESTRADIOL AND NORETHINDRONE ACETATE .5; .1 MG/1; MG/1
1 TABLET ORAL DAILY
Qty: 90 TAB | Refills: 0 | Status: SHIPPED | OUTPATIENT
Start: 2019-10-16 | End: 2020-02-17 | Stop reason: SDUPTHER

## 2019-11-01 DIAGNOSIS — Z12.39 BREAST SCREENING: ICD-10-CM

## 2020-02-13 NOTE — PROGRESS NOTES
Annual exam ages 40-58      Becca Fallon is a ,  60 y.o. female   Patient's last menstrual period was 10/18/2007. She presents for her annual checkup. She is having no significant problems. Depression--on medication and doing OK. With regard to the Gardasil vaccine, she is older than the FDA approved age to receive it. Menstrual status:    Her periods are nonexistent in flow due to menopause     She does not have dysmenorrhea. She reports no premenstrual symptoms. Contraception:    The current method of family planning is NA post menopause. Hormonal status:  She reports no perimenstrual type symptoms. She is not having vasomotor symptoms. The patient is not using any ERT. Sexual history:    She  reports previously being sexually active but not currently and has had partner(s) who are Male. She reports using the following method of birth control/protection: None. Medical conditions:    Since her last annual GYN exam about 1 1/2 years ago, she has not the following changes in her health history: none. Surgical history confirmed with patient. has a past surgical history that includes pr breast surgery procedure unlisted (11); pr biopsy of breast, incisional (2012); hx orthopaedic (); hx orthopaedic (3/2011); hx heent; hx cataract removal; hx heent; hx tonsillectomy (); hx hysteroscopy with endometrial ablation (); hx gyn (); hx urological (10/25/13); hx gyn; hx breast biopsy (Right, 2012); hx breast biopsy (Right, 2011); and hx breast biopsy (Right, 2012). Pap and Mammogram History:    Her most recent Pap smear was normal, obtained 1 1/2 year(s) ago. The patient had a recent mammogram on 10/26/19 which was negative for malignancy.     Breast Cancer History/Substance Abuse: positive breast cancer in maternal grandmother      Osteoporosis History:    Family history does not include a first or second degree relative with osteopenia or osteoporosis. A bone density scan was obtained on 10/19/18 and revealed a normal scan.      Past Medical History:   Diagnosis Date    Atypical ductal hyperplasia of breast 2012    Atypical ductal hyperplasia of breast     Colitis     Depression     GERD (gastroesophageal reflux disease) past week    reflux only occasionally    Hormone replacement therapy     Hx of bone density study 10/19/18, 2008    Normal, repeat 10 years    Hypertension     IBS (irritable bowel syndrome)     Lichen sclerosus et atrophicus of the vulva     Menopausal syndrome     Neck pain, chronic     Other ill-defined conditions(799.89)     old neck injury/pain comes & goes    Other ill-defined conditions(799.89)     seasonal allergies    Psychiatric disorder     depression    Seasonal allergies     Unspecified adverse effect of anesthesia      hard time waking up with one SX    Unspecified adverse effect of anesthesia     C/O \"waking up\" during one SX    Urinary incontinence      Past Surgical History:   Procedure Laterality Date    BREAST SURGERY PROCEDURE UNLISTED  6/13/11    RIGHT ductal excision    HX BREAST BIOPSY Right 09/11/2012    US guided-Atypical Ductal Hyperplasia    HX BREAST BIOPSY Right 06/13/2011    US guided-Stromal fibrosis w/benign mammary ducts and lobules    HX BREAST BIOPSY Right 09/19/2012    Surgical Excision-Atypical ductal hyperplasia, Fibrocystic change    HX CATARACT REMOVAL      2005, 2010    HX GYN  1994    PROCEDURE FOR LICHEN SCLEROSIS    HX GYN      Vulvoplasty    HX HEENT      LASIK    HX HEENT      Eye surgery for lazy eyes    HX HYSTEROSCOPY WITH ENDOMETRIAL ABLATION  2007    w/ D&C    HX ORTHOPAEDIC  1988    Arthroscopy of the knee LEFT    HX ORTHOPAEDIC  3/2011    Bone spur RIGHT FOOT    HX TONSILLECTOMY  1963    HX UROLOGICAL  10/25/13    Urodynamics    VT BIOPSY OF BREAST, INCISIONAL  9/2012    RIGHT for ADH       Current Outpatient Medications   Medication Sig Dispense Refill    APLENZIN 522 mg Tb24       irbesartan (AVAPRO) 150 mg tablet       sertraline (ZOLOFT) 100 mg tablet       estradiol-norethindrone (AMABELZ) 0.5-0.1 mg per tablet Take 1 Tab by mouth daily. 90 Tab 0    mirabegron ER (MYRBETRIQ) 50 mg ER tablet TAKE 1 TABLET DAILY 90 Tab 0    cholecalciferol (VITAMIN D3) 1,000 unit cap Take  by mouth daily.  famotidine (PEPCID) 20 mg tablet Take 20 mg by mouth two (2) times a day.  ondansetron hcl (ZOFRAN) 4 mg tablet Take 1 Tab by mouth every eight (8) hours as needed for Nausea for up to 16 doses. (Patient not taking: Reported on 2020) 12 Tab 0    vortioxetine (TRINTELLIX) 20 mg tablet Take  by mouth daily.  ALPRAZolam (XANAX) 0.5 mg tablet Take  by mouth.  promethazine (PHENERGAN) 25 mg tablet Take 1 Tab by mouth every six (6) hours as needed for Nausea. (Patient not taking: Reported on 2020) 12 Tab 0    buPROPion XL (WELLBUTRIN XL) 300 mg XL tablet Take 1 Tab by mouth every morning. (Patient not taking: Reported on 2020) 30 Tab 0    amLODIPine (NORVASC) 5 mg tablet Take 5 mg by mouth daily. Allergies: Demerol [meperidine]     Tobacco History:  reports that she has never smoked. She has never used smokeless tobacco.  Alcohol Abuse:  reports current alcohol use of about 3.3 standard drinks of alcohol per week. Drug Abuse:  reports no history of drug use.     Family Medical/Cancer History:   Family History   Problem Relation Age of Onset    Cancer Mother         LIVER    Headache Mother     Heart Disease Father     Breast Cancer Maternal Grandmother [address]         of \"bone cancer\" many yrs later   [facility] Osteoporosis Sister     Uterine Cancer Other         Aunt        Review of Systems - History obtained from the patient  Constitutional: negative for weight loss, fever, night sweats  HEENT: negative for hearing loss, earache, congestion, snoring, sorethroat  CV: negative for chest pain, palpitations, edema  Resp: negative for cough, shortness of breath, wheezing  GI: negative for change in bowel habits, abdominal pain, black or bloody stools--some IBS.   : negative for frequency, dysuria, hematuria, vaginal discharge  MSK: negative for back pain, joint pain, muscle pain  Breast: negative for breast lumps, nipple discharge, galactorrhea  Skin :negative for itching, rash, hives  Neuro: negative for dizziness, headache, confusion, weakness  Psych: negative for anxiety, depression, change in mood  Heme/lymph: negative for bleeding, bruising, pallor    Physical Exam    Visit Vitals  /76   Ht 5' 6\" (1.676 m)   Wt 172 lb 12.8 oz (78.4 kg)   LMP 10/18/2007   BMI 27.89 kg/m²       Constitutional  · Appearance: well-nourished, well developed, alert, in no acute distress    HENT  · Head and Face: appears normal    Neck  · Inspection/Palpation: normal appearance, no masses or tenderness  · Lymph Nodes: no lymphadenopathy present  · Thyroid: gland size normal, nontender, no nodules or masses present on palpation    Chest  · Respiratory Effort: breathing unlabored  · Auscultation: normal breath sounds    Cardiovascular  · Heart:  · Auscultation: regular rate and rhythm without murmur    Breasts  · Inspection of Breasts: breasts symmetrical, no skin changes, no discharge present, nipple appearance normal, no skin retraction present  · Palpation of Breasts and Axillae: no masses present on palpation, no breast tenderness  · Axillary Lymph Nodes: no lymphadenopathy present    Gastrointestinal  · Abdominal Examination: abdomen non-tender to palpation, normal bowel sounds, no masses present  · Liver and spleen: no hepatomegaly present, spleen not palpable  · Hernias: no hernias identified    Genitourinary  · External Genitalia: normal appearance for age, no discharge present, no tenderness present, no inflammatory lesions present, no masses present, no atrophy present  · Vagina: normal vaginal vault without central or paravaginal defects, no discharge present, no inflammatory lesions present, no masses present  · Bladder: non-tender to palpation  · Urethra: appears normal  · Cervix: normal   · Uterus: normal size, shape and consistency  · Adnexa: no adnexal tenderness present, no adnexal masses present  · Perineum: perineum within normal limits, no evidence of trauma, no rashes or skin lesions present  · Anus: anus within normal limits, no hemorrhoids present  · Inguinal Lymph Nodes: no lymphadenopathy present    Skin  · General Inspection: no rash, no lesions identified    Neurologic/Psychiatric  · Mental Status:  · Orientation: grossly oriented to person, place and time  · Mood and Affect: mood normal, affect appropriate    Assessment:  Routine gynecologic examination  Her current medical status is satisfactory with no evidence of significant gynecologic issues. Depression is a major issue.     Plan:  Counseled re: diet, exercise, healthy lifestyle  Return for yearly wellness visits  Rec annual mammogram

## 2020-02-17 ENCOUNTER — OFFICE VISIT (OUTPATIENT)
Dept: OBGYN CLINIC | Age: 65
End: 2020-02-17

## 2020-02-17 VITALS
DIASTOLIC BLOOD PRESSURE: 76 MMHG | WEIGHT: 172.8 LBS | HEIGHT: 66 IN | SYSTOLIC BLOOD PRESSURE: 128 MMHG | BODY MASS INDEX: 27.77 KG/M2

## 2020-02-17 DIAGNOSIS — F41.8 DEPRESSION WITH ANXIETY: ICD-10-CM

## 2020-02-17 DIAGNOSIS — Z01.419 ENCOUNTER FOR GYNECOLOGICAL EXAMINATION WITHOUT ABNORMAL FINDING: Primary | ICD-10-CM

## 2020-02-17 RX ORDER — ESTRADIOL AND NORETHINDRONE ACETATE .5; .1 MG/1; MG/1
1 TABLET ORAL DAILY
Qty: 90 TAB | Refills: 3 | Status: SHIPPED | OUTPATIENT
Start: 2020-02-17 | End: 2021-03-26 | Stop reason: SDUPTHER

## 2020-02-17 RX ORDER — BUPROPION HYDROBROMIDE 522 MG/1
TABLET, EXTENDED RELEASE ORAL
COMMUNITY
Start: 2020-02-13

## 2020-02-17 RX ORDER — SERTRALINE HYDROCHLORIDE 100 MG/1
TABLET, FILM COATED ORAL
COMMUNITY
Start: 2019-12-30

## 2020-02-17 RX ORDER — IRBESARTAN 150 MG/1
TABLET ORAL
COMMUNITY
Start: 2020-01-01

## 2020-02-17 NOTE — PATIENT INSTRUCTIONS
Well Visit, Women 48 to 72: Care Instructions  Your Care Instructions    Physical exams can help you stay healthy. Your doctor has checked your overall health and may have suggested ways to take good care of yourself. He or she also may have recommended tests. At home, you can help prevent illness with healthy eating, regular exercise, and other steps. Follow-up care is a key part of your treatment and safety. Be sure to make and go to all appointments, and call your doctor if you are having problems. It's also a good idea to know your test results and keep a list of the medicines you take. How can you care for yourself at home? · Reach and stay at a healthy weight. This will lower your risk for many problems, such as obesity, diabetes, heart disease, and high blood pressure. · Get at least 30 minutes of exercise on most days of the week. Walking is a good choice. You also may want to do other activities, such as running, swimming, cycling, or playing tennis or team sports. · Do not smoke. Smoking can make health problems worse. If you need help quitting, talk to your doctor about stop-smoking programs and medicines. These can increase your chances of quitting for good. · Protect your skin from too much sun. When you're outdoors from 10 a.m. to 4 p.m., stay in the shade or cover up with clothing and a hat with a wide brim. Wear sunglasses that block UV rays. Even when it's cloudy, put broad-spectrum sunscreen (SPF 30 or higher) on any exposed skin. · See a dentist one or two times a year for checkups and to have your teeth cleaned. · Wear a seat belt in the car. Follow your doctor's advice about when to have certain tests. These tests can spot problems early. · Cholesterol. Your doctor will tell you how often to have this done based on your age, family history, or other things that can increase your risk for heart attack and stroke. · Blood pressure.  Have your blood pressure checked during a routine doctor visit. Your doctor will tell you how often to check your blood pressure based on your age, your blood pressure results, and other factors. · Mammogram. Ask your doctor how often you should have a mammogram, which is an X-ray of your breasts. A mammogram can spot breast cancer before it can be felt and when it is easiest to treat. · Pap test and pelvic exam. Ask your doctor how often you should have a Pap test. You may not need to have a Pap test as often as you used to. · Vision. Have your eyes checked every year or two or as often as your doctor suggests. Some experts recommend that you have yearly exams for glaucoma and other age-related eye problems starting at age 48. · Hearing. Tell your doctor if you notice any change in your hearing. You can have tests to find out how well you hear. · Diabetes. Ask your doctor whether you should have tests for diabetes. · Colorectal cancer. Your risk for colorectal cancer gets higher as you get older. Some experts say that adults should start regular screening at age 48 and stop at age 76. Others say to start before age 48 or continue after age 76. Talk with your doctor about your risk and when to start and stop screening. · Thyroid disease. Talk to your doctor about whether to have your thyroid checked as part of a regular physical exam. Women have an increased chance of a thyroid problem. · Osteoporosis. You should begin tests for bone density at age 72. If you are younger than 72, ask your doctor whether you have factors that may increase your risk for this disease. You may want to have this test before age 72. · Heart attack and stroke risk. At least every 4 to 6 years, you should have your risk for heart attack and stroke assessed. Your doctor uses factors such as your age, blood pressure, cholesterol, and whether you smoke or have diabetes to show what your risk for a heart attack or stroke is over the next 10 years.   When should you call for help?  Watch closely for changes in your health, and be sure to contact your doctor if you have any problems or symptoms that concern you. Where can you learn more? Go to http://sena-ramya.info/. Enter F081 in the search box to learn more about \"Well Visit, Women 50 to 72: Care Instructions. \"  Current as of: December 13, 2018  Content Version: 12.2  © 6945-2982 [employer]. Care instructions adapted under license by [employer] (which disclaims liability or warranty for this information). If you have questions about a medical condition or this instruction, always ask your healthcare professional. [address] any warranty or liability for your use of this information.

## 2021-01-14 ENCOUNTER — TELEPHONE (OUTPATIENT)
Dept: OBGYN CLINIC | Age: 66
End: 2021-01-14

## 2021-01-14 NOTE — TELEPHONE ENCOUNTER
Call received at 12:15PM      63year old patient last seen in the office on 2/17/2020 and has next ae and mammogram on 2/29/2021    Patient calling to say that she would like to have a prescription for her      mirabegron ER (MYRBETRIQ) 50 mg ER tablet     Mailed to her so she can use a different pharmacy since her [employer] [facility] home delivery is $1000 for 90 tabs    And patient realized the price for $389 is only for 30 day supply    Patient will check with her insurance and call us back. Patient verbalized understanding.

## 2021-03-10 ENCOUNTER — TRANSCRIBE ORDER (OUTPATIENT)
Dept: SCHEDULING | Age: 66
End: 2021-03-10

## 2021-03-10 DIAGNOSIS — Z12.31 VISIT FOR SCREENING MAMMOGRAM: Primary | ICD-10-CM

## 2021-03-26 ENCOUNTER — OFFICE VISIT (OUTPATIENT)
Dept: OBGYN CLINIC | Age: 66
End: 2021-03-26
Payer: COMMERCIAL

## 2021-03-26 VITALS
HEIGHT: 66 IN | WEIGHT: 179 LBS | DIASTOLIC BLOOD PRESSURE: 60 MMHG | BODY MASS INDEX: 28.77 KG/M2 | SYSTOLIC BLOOD PRESSURE: 124 MMHG

## 2021-03-26 DIAGNOSIS — Z12.4 CERVICAL CANCER SCREENING: ICD-10-CM

## 2021-03-26 DIAGNOSIS — Z01.419 ENCOUNTER FOR WELL WOMAN EXAM WITH ROUTINE GYNECOLOGICAL EXAM: Primary | ICD-10-CM

## 2021-03-26 PROBLEM — E04.2 NON-TOXIC MULTINODULAR GOITER: Status: ACTIVE | Noted: 2021-03-26

## 2021-03-26 PROBLEM — I10 HYPERTENSION: Status: ACTIVE | Noted: 2021-03-26

## 2021-03-26 PROBLEM — E78.5 HYPERLIPIDEMIA: Status: ACTIVE | Noted: 2021-03-26

## 2021-03-26 PROCEDURE — 99396 PREV VISIT EST AGE 40-64: CPT | Performed by: OBSTETRICS & GYNECOLOGY

## 2021-03-26 RX ORDER — ESTRADIOL AND NORETHINDRONE ACETATE .5; .1 MG/1; MG/1
1 TABLET ORAL DAILY
Qty: 90 TAB | Refills: 3 | Status: SHIPPED | OUTPATIENT
Start: 2021-03-26 | End: 2022-03-08 | Stop reason: SDUPTHER

## 2021-03-26 NOTE — PATIENT INSTRUCTIONS
Well Visit, Women 50 to 65: Care Instructions  Your Care Instructions     Physical exams can help you stay healthy. Your doctor has checked your overall health and may have suggested ways to take good care of yourself. He or she also may have recommended tests. At home, you can help prevent illness with healthy eating, regular exercise, and other steps.  Follow-up care is a key part of your treatment and safety. Be sure to make and go to all appointments, and call your doctor if you are having problems. It's also a good idea to know your test results and keep a list of the medicines you take.  How can you care for yourself at home?  · Reach and stay at a healthy weight. This will lower your risk for many problems, such as obesity, diabetes, heart disease, and high blood pressure.  · Get at least 30 minutes of exercise on most days of the week. Walking is a good choice. You also may want to do other activities, such as running, swimming, cycling, or playing tennis or team sports.  · Do not smoke. Smoking can make health problems worse. If you need help quitting, talk to your doctor about stop-smoking programs and medicines. These can increase your chances of quitting for good.  · Protect your skin from too much sun. When you're outdoors from 10 a.m. to 4 p.m., stay in the shade or cover up with clothing and a hat with a wide brim. Wear sunglasses that block UV rays. Even when it's cloudy, put broad-spectrum sunscreen (SPF 30 or higher) on any exposed skin.  · See a dentist one or two times a year for checkups and to have your teeth cleaned.  · Wear a seat belt in the car.  Follow your doctor's advice about when to have certain tests. These tests can spot problems early.  · Cholesterol. Your doctor will tell you how often to have this done based on your age, family history, or other things that can increase your risk for heart attack and stroke.  · Blood pressure. Have your blood pressure checked during a routine  doctor visit. Your doctor will tell you how often to check your blood pressure based on your age, your blood pressure results, and other factors. · Mammogram. Ask your doctor how often you should have a mammogram, which is an X-ray of your breasts. A mammogram can spot breast cancer before it can be felt and when it is easiest to treat. · Pap test and pelvic exam. Ask your doctor how often you should have a Pap test. You may not need to have a Pap test as often as you used to. · Vision. Have your eyes checked every year or two or as often as your doctor suggests. Some experts recommend that you have yearly exams for glaucoma and other age-related eye problems starting at age 48. · Hearing. Tell your doctor if you notice any change in your hearing. You can have tests to find out how well you hear. · Diabetes. Ask your doctor whether you should have tests for diabetes. · Colorectal cancer. Your risk for colorectal cancer gets higher as you get older. Some experts say that adults should start regular screening at age 48 and stop at age 76. Others say to start before age 48 or continue after age 76. Talk with your doctor about your risk and when to start and stop screening. · Thyroid disease. Talk to your doctor about whether to have your thyroid checked as part of a regular physical exam. Women have an increased chance of a thyroid problem. · Osteoporosis. You should begin tests for bone density at age 72. If you are younger than 72, ask your doctor whether you have factors that may increase your risk for this disease. You may want to have this test before age 72. · Heart attack and stroke risk. At least every 4 to 6 years, you should have your risk for heart attack and stroke assessed. Your doctor uses factors such as your age, blood pressure, cholesterol, and whether you smoke or have diabetes to show what your risk for a heart attack or stroke is over the next 10 years.   When should you call for help?  Watch closely for changes in your health, and be sure to contact your doctor if you have any problems or symptoms that concern you. Where can you learn more? Go to http://www.gray.com/  Enter [id number] in the search box to learn more about \"Well Visit, Women 50 to 72: Care Instructions. \"  Current as of: May 27, 2020               Content Version: 12.6  © 3808-2903 [employer], [employer]. Care instructions adapted under license by [employer] (which disclaims liability or warranty for this information). If you have questions about a medical condition or this instruction, always ask your healthcare professional. [address] any warranty or liability for your use of this information.

## 2021-03-26 NOTE — PROGRESS NOTES
Annual exam ages 40-58      Manuel Maher is a ,  63 y.o. female   Patient's last menstrual period was 10/18/2007. She presents for her annual checkup. She is having no significant problems. With regard to the Gardasil vaccine, she is older than the FDA approved age to receive it. Menstrual status:    Her periods are absent in flow due to menopause     She reports no premenstrual symptoms. Contraception:    The current method of family planning is NA post menopause. Hormonal status:  She reports no perimenstrual type symptoms. She is not having vasomotor symptoms. The patient is not using any ERT. Sexual history:    She  reports previously being sexually active and has had partner(s) who are Male. She reports using the following method of birth control/protection: None. Medical conditions:    Since her last annual GYN exam about one year ago, she has not the following changes in her health history: none. Surgical history confirmed with patient. has a past surgical history that includes pr breast surgery procedure unlisted (11); pr biopsy of breast, incisional (2012); hx orthopaedic (); hx orthopaedic (3/2011); hx heent; hx cataract removal; hx heent; hx tonsillectomy (); hx hysteroscopy with endometrial ablation (); hx gyn (); hx urological (10/25/13); hx gyn; hx breast biopsy (Right, 2012); hx breast biopsy (Right, 2011); and hx breast biopsy (Right, 2012). Pap and Mammogram History:    Her most recent Pap smear was normal, obtained 4 year(s) ago. The patient has not had a recent mammogram.    Breast Cancer History/Substance Abuse: breast cancer in maternal grandmother      Osteoporosis History:    Family history does not include a first or second degree relative with osteopenia or osteoporosis. A bone density scan was obtained in 2018 and revealed a normal scan. She is currently taking  vit D.     Past Medical History: Diagnosis Date    Atypical ductal hyperplasia of breast 2012    Atypical ductal hyperplasia of breast     Colitis     Depression with anxiety 8/8/2016    GERD (gastroesophageal reflux disease) past week    reflux only occasionally    Hormone replacement therapy     Hx of bone density study 10/19/18, 2008    Normal, repeat 10 years    Hypertension     IBS (irritable bowel syndrome)     Lichen sclerosus et atrophicus of the vulva     Menopausal syndrome     Neck pain, chronic     Other ill-defined conditions(799.89)     old neck injury/pain comes & goes    Seasonal allergies     Unspecified adverse effect of anesthesia      hard time waking up with one SX    Unspecified adverse effect of anesthesia     C/O \"waking up\" during one SX    Urinary incontinence      Past Surgical History:   Procedure Laterality Date    HX BREAST BIOPSY Right 09/11/2012    US guided-Atypical Ductal Hyperplasia    HX BREAST BIOPSY Right 06/13/2011    US guided-Stromal fibrosis w/benign mammary ducts and lobules    HX BREAST BIOPSY Right 09/19/2012    Surgical Excision-Atypical ductal hyperplasia, Fibrocystic change    HX CATARACT REMOVAL      2005, 2010    HX GYN  1994    PROCEDURE FOR LICHEN SCLEROSIS    HX GYN      Vulvoplasty    HX HEENT      LASIK    HX HEENT      Eye surgery for lazy eyes    HX HYSTEROSCOPY WITH ENDOMETRIAL ABLATION  2007    w/ D&C    HX ORTHOPAEDIC  1988    Arthroscopy of the knee LEFT    HX ORTHOPAEDIC  3/2011    Bone spur RIGHT FOOT    HX TONSILLECTOMY  1963    HX UROLOGICAL  10/25/13    Urodynamics    TN BIOPSY OF BREAST, INCISIONAL  9/2012    RIGHT for ADH    TN BREAST SURGERY PROCEDURE UNLISTED  6/13/11    RIGHT ductal excision       Current Outpatient Medications   Medication Sig Dispense Refill    APLENZIN 522 mg Tb24       irbesartan (AVAPRO) 150 mg tablet       sertraline (ZOLOFT) 100 mg tablet       estradiol-norethindrone (AMABELZ) 0.5-0.1 mg per tablet Take 1 Tab by mouth daily. 90 Tab 3    mirabegron ER (MYRBETRIQ) 50 mg ER tablet TAKE 1 TABLET DAILY 90 Tab 3    cholecalciferol (VITAMIN D3) 1,000 unit cap Take  by mouth daily.  amLODIPine (NORVASC) 5 mg tablet Take 5 mg by mouth daily.  famotidine (PEPCID) 20 mg tablet Take 20 mg by mouth two (2) times a day.  ondansetron hcl (ZOFRAN) 4 mg tablet Take 1 Tab by mouth every eight (8) hours as needed for Nausea for up to 16 doses. (Patient not taking: Reported on 2020) 12 Tab 0    vortioxetine (TRINTELLIX) 20 mg tablet Take  by mouth daily.  ALPRAZolam (XANAX) 0.5 mg tablet Take  by mouth.  promethazine (PHENERGAN) 25 mg tablet Take 1 Tab by mouth every six (6) hours as needed for Nausea. (Patient not taking: Reported on 2020) 12 Tab 0    buPROPion XL (WELLBUTRIN XL) 300 mg XL tablet Take 1 Tab by mouth every morning. (Patient not taking: Reported on 2020) 30 Tab 0     Allergies: Demerol [meperidine]     Tobacco History:  reports that she has never smoked. She has never used smokeless tobacco.  Alcohol Abuse:  reports current alcohol use of about 3.3 standard drinks of alcohol per week. Drug Abuse:  reports no history of drug use.     Family Medical/Cancer History:   Family History   Problem Relation Age of Onset    Cancer Mother         LIVER    Headache Mother     Heart Disease Father     Breast Cancer Maternal Grandmother 36         of \"bone cancer\" many yrs later   [facility] Osteoporosis Sister     Uterine Cancer Other         Aunt        Review of Systems - History obtained from the patient  Constitutional: negative for weight loss, fever, night sweats  HEENT: negative for hearing loss, earache, congestion, snoring, sorethroat  CV: negative for chest pain, palpitations, edema  Resp: negative for cough, shortness of breath, wheezing  GI: negative for change in bowel habits, abdominal pain, black or bloody stools  : negative for frequency, dysuria, hematuria, vaginal discharge  MSK: negative for back pain, joint pain, muscle pain  Breast: negative for breast lumps, nipple discharge, galactorrhea  Skin :negative for itching, rash, hives  Neuro: negative for dizziness, headache, confusion, weakness  Psych: negative for anxiety, depression, change in mood  Heme/lymph: negative for bleeding, bruising, pallor    Physical Exam    Visit Vitals  /60   Ht 5' 6\" (1.676 m)   Wt 179 lb (81.2 kg)   LMP 10/18/2007   BMI 28.89 kg/m²       Constitutional  · Appearance: well-nourished, well developed, alert, in no acute distress    HENT  · Head and Face: appears normal    Neck  · Inspection/Palpation: normal appearance, no masses or tenderness  · Lymph Nodes: no lymphadenopathy present  · Thyroid: gland size normal, nontender, no nodules or masses present on palpation    Chest  · Respiratory Effort: breathing unlabored  · Auscultation: normal breath sounds    Cardiovascular  · Heart:  · Auscultation: regular rate and rhythm without murmur    Breasts  · Inspection of Breasts: breasts symmetrical, no skin changes, no discharge present, nipple appearance normal, no skin retraction present  · Palpation of Breasts and Axillae: no masses present on palpation, no breast tenderness  · Axillary Lymph Nodes: no lymphadenopathy present    Gastrointestinal  · Abdominal Examination: abdomen non-tender to palpation, normal bowel sounds, no masses present  · Liver and spleen: no hepatomegaly present, spleen not palpable  · Hernias: no hernias identified    Genitourinary  · External Genitalia: normal appearance for age, no discharge present, no tenderness present, no inflammatory lesions present, no masses present, no atrophy present  · Vagina: normal vaginal vault without central or paravaginal defects, no discharge present, no inflammatory lesions present, no masses present  · Bladder: non-tender to palpation  · Urethra: appears normal  · Cervix: normal   · Uterus: normal size, shape and consistency  · Adnexa: no adnexal tenderness present, no adnexal masses present  · Perineum: perineum within normal limits, no evidence of trauma, no rashes or skin lesions present  · Anus: anus within normal limits, no hemorrhoids present  · Inguinal Lymph Nodes: no lymphadenopathy present    Skin  · General Inspection: no rash, no lesions identified    Neurologic/Psychiatric  · Mental Status:  · Orientation: grossly oriented to person, place and time  · Mood and Affect: mood normal, affect appropriate    Assessment:  Routine gynecologic examination  Her current medical status is satisfactory with no evidence of significant gynecologic issues.     Plan:  Counseled re: diet, exercise, healthy lifestyle  Return for yearly wellness visits  Rec annual mammogram

## 2021-03-31 LAB
CYTOLOGIST CVX/VAG CYTO: NORMAL
CYTOLOGY CVX/VAG DOC CYTO: NORMAL
CYTOLOGY CVX/VAG DOC THIN PREP: NORMAL
CYTOLOGY HISTORY:: NORMAL
DX ICD CODE: NORMAL
HPV I/H RISK 4 DNA CVX QL PROBE+SIG AMP: NEGATIVE
Lab: NORMAL
OTHER STN SPEC: NORMAL
STAT OF ADQ CVX/VAG CYTO-IMP: NORMAL

## 2021-05-28 ENCOUNTER — HOSPITAL ENCOUNTER (OUTPATIENT)
Dept: MAMMOGRAPHY | Age: 66
Discharge: HOME | End: 2021-05-28
Attending: OBSTETRICS & GYNECOLOGY
Payer: COMMERCIAL

## 2021-05-28 DIAGNOSIS — Z12.31 VISIT FOR SCREENING MAMMOGRAM: ICD-10-CM

## 2021-05-28 PROCEDURE — 77063 BREAST TOMOSYNTHESIS BI: CPT

## 2021-12-27 NOTE — PATIENT INSTRUCTIONS
Well Visit, Women 48 to 72: Care Instructions  Your Care Instructions    Physical exams can help you stay healthy. Your doctor has checked your overall health and may have suggested ways to take good care of yourself. He or she also may have recommended tests. At home, you can help prevent illness with healthy eating, regular exercise, and other steps. Follow-up care is a key part of your treatment and safety. Be sure to make and go to all appointments, and call your doctor if you are having problems. It's also a good idea to know your test results and keep a list of the medicines you take. How can you care for yourself at home? · Reach and stay at a healthy weight. This will lower your risk for many problems, such as obesity, diabetes, heart disease, and high blood pressure. · Get at least 30 minutes of exercise on most days of the week. Walking is a good choice. You also may want to do other activities, such as running, swimming, cycling, or playing tennis or team sports. · Do not smoke. Smoking can make health problems worse. If you need help quitting, talk to your doctor about stop-smoking programs and medicines. These can increase your chances of quitting for good. · Protect your skin from too much sun. When you're outdoors from 10 a.m. to 4 p.m., stay in the shade or cover up with clothing and a hat with a wide brim. Wear sunglasses that block UV rays. Even when it's cloudy, put broad-spectrum sunscreen (SPF 30 or higher) on any exposed skin. · See a dentist one or two times a year for checkups and to have your teeth cleaned. · Wear a seat belt in the car. · Limit alcohol to 1 drink a day. Too much alcohol can cause health problems. Follow your doctor's advice about when to have certain tests. These tests can spot problems early. · Cholesterol.  Your doctor will tell you how often to have this done based on your age, family history, or other things that can increase your risk for heart attack and stroke. · Blood pressure. Have your blood pressure checked during a routine doctor visit. Your doctor will tell you how often to check your blood pressure based on your age, your blood pressure results, and other factors. · Mammogram. Ask your doctor how often you should have a mammogram, which is an X-ray of your breasts. A mammogram can spot breast cancer before it can be felt and when it is easiest to treat. · Pap test and pelvic exam. Ask your doctor how often you should have a Pap test. You may not need to have a Pap test as often as you used to. · Vision. Have your eyes checked every year or two or as often as your doctor suggests. Some experts recommend that you have yearly exams for glaucoma and other age-related eye problems starting at age 48. · Hearing. Tell your doctor if you notice any change in your hearing. You can have tests to find out how well you hear. · Diabetes. Ask your doctor whether you should have tests for diabetes. · Colon cancer. You should begin tests for colon cancer at age 48. You may have one of several tests. Your doctor will tell you how often to have tests based on your age and risk. Risks include whether you already had a precancerous polyp removed from your colon or whether your parents, sisters and brothers, or children have had colon cancer. · Thyroid disease. Talk to your doctor about whether to have your thyroid checked as part of a regular physical exam. Women have an increased chance of a thyroid problem. · Osteoporosis. You should begin tests for bone density at age 72. If you are younger than 72, ask your doctor whether you have factors that may increase your risk for this disease. You may want to have this test before age 72. · Heart attack and stroke risk. At least every 4 to 6 years, you should have your risk for heart attack and stroke assessed.  Your doctor uses factors such as your age, blood pressure, cholesterol, and whether you smoke or have diabetes to show what your risk for a heart attack or stroke is over the next 10 years. When should you call for help? Watch closely for changes in your health, and be sure to contact your doctor if you have any problems or symptoms that concern you. Where can you learn more? Go to http://sena-ramya.info/. Enter X874 in the search box to learn more about \"Well Visit, Women 50 to 72: Care Instructions. \"  Current as of: May 16, 2017  Content Version: 11.7  © 6208-1346 [employer]. Care instructions adapted under license by [employer] (which disclaims liability or warranty for this information). If you have questions about a medical condition or this instruction, always ask your healthcare professional. [address] any warranty or liability for your use of this information. finger braces

## 2022-03-08 ENCOUNTER — OFFICE VISIT (OUTPATIENT)
Dept: OBGYN CLINIC | Age: 67
End: 2022-03-08
Payer: COMMERCIAL

## 2022-03-08 VITALS — SYSTOLIC BLOOD PRESSURE: 122 MMHG | DIASTOLIC BLOOD PRESSURE: 68 MMHG | BODY MASS INDEX: 28.41 KG/M2 | WEIGHT: 176 LBS

## 2022-03-08 DIAGNOSIS — Z01.419 ENCOUNTER FOR WELL WOMAN EXAM WITH ROUTINE GYNECOLOGICAL EXAM: Primary | ICD-10-CM

## 2022-03-08 PROCEDURE — 99396 PREV VISIT EST AGE 40-64: CPT | Performed by: OBSTETRICS & GYNECOLOGY

## 2022-03-08 RX ORDER — ESTRADIOL AND NORETHINDRONE ACETATE .5; .1 MG/1; MG/1
1 TABLET ORAL DAILY
Qty: 90 TABLET | Refills: 3 | Status: SHIPPED | OUTPATIENT
Start: 2022-03-08 | End: 2022-06-23

## 2022-03-08 NOTE — PATIENT INSTRUCTIONS

## 2022-03-08 NOTE — PROGRESS NOTES
Annual exam ages 40-58      Harmeet Monday is a ,  61 y.o. female   Patient's last menstrual period was 10/18/2007. She presents for her annual checkup. She is having no significant problems. With regard to the Gardasil vaccine, she is older than the FDA approved age to receive it. Menstrual status:    Her periods are absent in flow due to menopause     She reports no premenstrual symptoms. Contraception:    The current method of family planning is NA post menopause. Hormonal status:  She reports no perimenstrual type symptoms. She is not having vasomotor symptoms. The patient is not using any ERT. Sexual history:    She  reports previously being sexually active and has had partner(s) who are male. She reports using the following method of birth control/protection: None. Medical conditions:    Since her last annual GYN exam about one year ago, she has not the following changes in her health history: none. Surgical history confirmed with patient. has a past surgical history that includes pr breast surgery procedure unlisted (11); pr biopsy of breast, incisional (2012); hx orthopaedic (); hx orthopaedic (3/2011); hx heent; hx cataract removal; hx heent; hx tonsillectomy (); hx hysteroscopy with endometrial ablation (); hx gyn (); hx urological (10/25/13); hx gyn; hx breast biopsy (Right, 2012); hx breast biopsy (Right, 2011); and hx breast biopsy (Right, 2012). Pap and Mammogram History:    Her most recent Pap smear was normal, obtained 1 year(s) ago. The patient has not had a recent mammogram but is scheduled for May. Breast Cancer History/Substance Abuse:breast cancer in maternal grandmother      Osteoporosis History:    Family history does not include a first or second degree relative with osteopenia or osteoporosis. A bone density scan was obtained in 2018 and revealed a normal scan.    She is currently taking vit D.    Past Medical History:   Diagnosis Date    Atypical ductal hyperplasia of breast 2012    Atypical ductal hyperplasia of breast     Colitis     Depression with anxiety 8/8/2016    GERD (gastroesophageal reflux disease) past week    reflux only occasionally    Hormone replacement therapy     Hx of bone density study 10/19/18, 2008    Normal, repeat 10 years    Hypertension     IBS (irritable bowel syndrome)     Lichen sclerosus et atrophicus of the vulva     Menopausal syndrome     Neck pain, chronic     Other ill-defined conditions(799.89)     old neck injury/pain comes & goes    Seasonal allergies     Unspecified adverse effect of anesthesia      hard time waking up with one SX    Unspecified adverse effect of anesthesia     C/O \"waking up\" during one SX    Urinary incontinence      Past Surgical History:   Procedure Laterality Date    HX BREAST BIOPSY Right 09/11/2012    US guided-Atypical Ductal Hyperplasia    HX BREAST BIOPSY Right 06/13/2011    US guided-Stromal fibrosis w/benign mammary ducts and lobules    HX BREAST BIOPSY Right 09/19/2012    Surgical Excision-Atypical ductal hyperplasia, Fibrocystic change    HX CATARACT REMOVAL      2005, 2010    HX GYN  1994    PROCEDURE FOR LICHEN SCLEROSIS    HX GYN      Vulvoplasty    HX HEENT      LASIK    HX HEENT      Eye surgery for lazy eyes    HX HYSTEROSCOPY WITH ENDOMETRIAL ABLATION  2007    w/ D&C    HX ORTHOPAEDIC  1988    Arthroscopy of the knee LEFT    HX ORTHOPAEDIC  3/2011    Bone spur RIGHT FOOT    HX TONSILLECTOMY  1963    HX UROLOGICAL  10/25/13    Urodynamics    MA BIOPSY OF BREAST, INCISIONAL  9/2012    RIGHT for ADH    MA BREAST SURGERY PROCEDURE UNLISTED  6/13/11    RIGHT ductal excision       Current Outpatient Medications   Medication Sig Dispense Refill    mirabegron ER (Myrbetriq) 50 mg ER tablet TAKE 1 TABLET DAILY 90 Tablet 3    estradiol-norethindrone (Amabelz) 0.5-0.1 mg per tablet Take 1 Tab by mouth daily. 90 Tab 3    APLENZIN 522 mg Tb24       irbesartan (AVAPRO) 150 mg tablet       sertraline (ZOLOFT) 100 mg tablet       cholecalciferol (VITAMIN D3) 1,000 unit cap Take  by mouth daily.  amLODIPine (NORVASC) 5 mg tablet Take 5 mg by mouth daily.  famotidine (PEPCID) 20 mg tablet Take 20 mg by mouth two (2) times a day. (Patient not taking: Reported on 3/8/2022)      ondansetron hcl (ZOFRAN) 4 mg tablet Take 1 Tab by mouth every eight (8) hours as needed for Nausea for up to 16 doses. (Patient not taking: Reported on 2020) 12 Tab 0    vortioxetine (TRINTELLIX) 20 mg tablet Take  by mouth daily.  ALPRAZolam (XANAX) 0.5 mg tablet Take  by mouth. (Patient not taking: Reported on 3/8/2022)      promethazine (PHENERGAN) 25 mg tablet Take 1 Tab by mouth every six (6) hours as needed for Nausea. (Patient not taking: Reported on 2020) 12 Tab 0    buPROPion XL (WELLBUTRIN XL) 300 mg XL tablet Take 1 Tab by mouth every morning. (Patient not taking: Reported on 2020) 30 Tab 0     Allergies: Demerol [meperidine]     Tobacco History:  reports that she has never smoked. She has never used smokeless tobacco.  Alcohol Abuse:  reports current alcohol use of about 3.3 standard drinks of alcohol per week. Drug Abuse:  reports no history of drug use.     Family Medical/Cancer History:   Family History   Problem Relation Age of Onset    Cancer Mother         LIVER    Headache Mother     Heart Disease Father     Breast Cancer Maternal Grandmother 36         of \"bone cancer\" many yrs later   [facility] Osteoporosis Sister     Uterine Cancer Other         Aunt        Review of Systems - History obtained from the patient  Constitutional: negative for weight loss, fever, night sweats  HEENT: negative for hearing loss, earache, congestion, snoring, sorethroat  CV: negative for chest pain, palpitations, edema  Resp: negative for cough, shortness of breath, wheezing  GI: negative for change in bowel habits, abdominal pain, black or bloody stools  : negative for frequency, dysuria, hematuria, vaginal discharge  MSK: negative for back pain, joint pain, muscle pain  Breast: negative for breast lumps, nipple discharge, galactorrhea  Skin :negative for itching, rash, hives  Neuro: negative for dizziness, headache, confusion, weakness  Psych: negative for anxiety, depression, change in mood  Heme/lymph: negative for bleeding, bruising, pallor    Physical Exam    Visit Vitals  /68   Wt 176 lb (79.8 kg)   LMP 10/18/2007   BMI 28.41 kg/m²       Constitutional  · Appearance: well-nourished, well developed, alert, in no acute distress    HENT  · Head and Face: appears normal    Neck  · Inspection/Palpation: normal appearance, no masses or tenderness  · Lymph Nodes: no lymphadenopathy present  · Thyroid: gland size normal, nontender, no nodules or masses present on palpation    Chest  · Respiratory Effort: breathing unlabored  · Auscultation: normal breath sounds    Cardiovascular  · Heart:  · Auscultation: regular rate and rhythm without murmur    Breasts  · Inspection of Breasts: breasts symmetrical, no skin changes, no discharge present, nipple appearance normal, no skin retraction present  · Palpation of Breasts and Axillae: no masses present on palpation, no breast tenderness  · Axillary Lymph Nodes: no lymphadenopathy present    Gastrointestinal  · Abdominal Examination: abdomen non-tender to palpation, normal bowel sounds, no masses present  · Liver and spleen: no hepatomegaly present, spleen not palpable  · Hernias: no hernias identified    Genitourinary  · External Genitalia: normal appearance for age, no discharge present, no tenderness present, no inflammatory lesions present, no masses present, no atrophy present  · Vagina: normal vaginal vault without central or paravaginal defects, no discharge present, no inflammatory lesions present, no masses present  · Bladder: non-tender to palpation  · Urethra: appears normal  · Cervix: normal   · Uterus: normal size, shape and consistency  · Adnexa: no adnexal tenderness present, no adnexal masses present  · Perineum: perineum within normal limits, no evidence of trauma, no rashes or skin lesions present  · Anus: anus within normal limits, no hemorrhoids present  · Inguinal Lymph Nodes: no lymphadenopathy present    Skin  · General Inspection: no rash, no lesions identified    Neurologic/Psychiatric  · Mental Status:  · Orientation: grossly oriented to person, place and time  · Mood and Affect: mood normal, affect appropriate    Assessment:  Routine gynecologic examination  Her current medical status is satisfactory with no evidence of significant gynecologic issues.     Plan:  Counseled re: diet, exercise, healthy lifestyle  Return for yearly wellness visits  Rec annual mammogram

## 2022-03-09 ENCOUNTER — TELEPHONE (OUTPATIENT)
Dept: OBGYN CLINIC | Age: 67
End: 2022-03-09

## 2022-03-09 RX ORDER — HALOBETASOL PROPIONATE 0.5 MG/G
CREAM TOPICAL 2 TIMES DAILY
Qty: 50 G | Refills: 3 | Status: SHIPPED | OUTPATIENT
Start: 2022-03-09 | End: 2022-03-09 | Stop reason: SDUPTHER

## 2022-03-09 RX ORDER — HALOBETASOL PROPIONATE 0.5 MG/G
CREAM TOPICAL 2 TIMES DAILY
Qty: 50 G | Refills: 3 | Status: SHIPPED | OUTPATIENT
Start: 2022-03-09

## 2022-03-09 NOTE — TELEPHONE ENCOUNTER
Message left at 9:18am      61year old patient last seen in the office yesterday for ae    Patient left a message about needing a prescription for medication to  help with her lichen sclerosis      Pharmacy confirmed  [facility]    Patient states you will know the name of the medication    halobetasol (ULTRAVATE) 0.05 % topical cream ???? 5/2017    Please advise    Thank you

## 2022-03-09 NOTE — TELEPHONE ENCOUNTER
Patient advised of MD sent prescription to her requested local pharmacy    Patient verbalized understanding.

## 2022-03-09 NOTE — TELEPHONE ENCOUNTER
Patient does want the prescription to go to her local pharmacy as noted in the notes below    [facility]    Thank you

## 2022-03-19 PROBLEM — E78.5 HYPERLIPIDEMIA: Status: ACTIVE | Noted: 2021-03-26

## 2022-03-19 PROBLEM — E04.2 NON-TOXIC MULTINODULAR GOITER: Status: ACTIVE | Noted: 2021-03-26

## 2022-03-20 PROBLEM — I10 HYPERTENSION: Status: ACTIVE | Noted: 2021-03-26

## 2022-06-23 ENCOUNTER — TELEPHONE (OUTPATIENT)
Dept: OBGYN CLINIC | Age: 67
End: 2022-06-23

## 2022-06-23 RX ORDER — ESTRADIOL AND NORETHINDRONE ACETATE .5; .1 MG/1; MG/1
1 TABLET ORAL DAILY
Qty: 90 TABLET | Refills: 3 | Status: SHIPPED | OUTPATIENT
Start: 2022-06-23

## 2022-06-23 NOTE — TELEPHONE ENCOUNTER
Patient phoned got laid off of her job. Needs her script for Estradiol Norethindrone 0.5-0.1mg sent to another pharmacy. AE was 3/8/2022. Pharmacy [address]

## 2022-08-18 ENCOUNTER — TELEPHONE (OUTPATIENT)
Dept: OBGYN CLINIC | Age: 67
End: 2022-08-18

## 2022-08-18 NOTE — TELEPHONE ENCOUNTER
74year old patient last seen in the office on 3/8/2022 for ae    Patient calling to say that she lost her job the beginning of march 2022 and never got the prescription from the mail order pharmacy for        mirabegron ER (Myrbetriq) 50 mg ER tablet   Prescription resent as per Md order to patient confirmed local pharmacy to get patient to when next ae due 3/2023

## 2022-09-29 ENCOUNTER — TRANSCRIBE ORDER (OUTPATIENT)
Dept: SCHEDULING | Age: 67
End: 2022-09-29

## 2022-09-29 DIAGNOSIS — Z12.31 ENCOUNTER FOR SCREENING MAMMOGRAM FOR MALIGNANT NEOPLASM OF BREAST: Primary | ICD-10-CM

## 2022-10-28 ENCOUNTER — HOSPITAL ENCOUNTER (OUTPATIENT)
Dept: MAMMOGRAPHY | Age: 67
Discharge: HOME | End: 2022-10-28
Attending: OBSTETRICS & GYNECOLOGY
Payer: MEDICARE

## 2022-10-28 DIAGNOSIS — Z12.31 ENCOUNTER FOR SCREENING MAMMOGRAM FOR MALIGNANT NEOPLASM OF BREAST: ICD-10-CM

## 2022-10-28 PROCEDURE — 77063 BREAST TOMOSYNTHESIS BI: CPT

## 2022-12-06 ENCOUNTER — TRANSCRIBE ORDER (OUTPATIENT)
Dept: SCHEDULING | Age: 67
End: 2022-12-06

## 2022-12-06 DIAGNOSIS — M54.2 CERVICALGIA: Primary | ICD-10-CM

## 2022-12-06 DIAGNOSIS — M54.12 BRACHIAL NEURITIS: ICD-10-CM

## 2023-03-01 ENCOUNTER — TELEPHONE (OUTPATIENT)
Dept: OBGYN CLINIC | Age: 68
End: 2023-03-01

## 2023-03-01 RX ORDER — SOLIFENACIN SUCCINATE 10 MG/1
10 TABLET, FILM COATED ORAL DAILY
Qty: 90 TABLET | Refills: 2 | Status: SHIPPED | OUTPATIENT
Start: 2023-03-01

## 2023-03-01 NOTE — TELEPHONE ENCOUNTER
74year old patient last seen in the office on 3/8/2022 and has next ae on 5/30/2023    Patient is calling to ask for the generic of        mirabegron ER (Myrbetriq) 50 mg ER tablet  To be sent to her confirmed local pharmacy since the brand is too expensive    Please advise    Thank you

## 2023-03-01 NOTE — TELEPHONE ENCOUNTER
Patient advised of MD recommendations and is requesting another medication for over active bladder that is less expensive      Please advice

## 2023-04-22 DIAGNOSIS — M54.12 BRACHIAL NEURITIS: ICD-10-CM

## 2023-04-22 DIAGNOSIS — M54.2 CERVICALGIA: Primary | ICD-10-CM

## 2023-05-09 RX ORDER — ESTRADIOL AND NORETHINDRONE ACETATE .5; .1 MG/1; MG/1
TABLET ORAL
Qty: 28 TABLET | Refills: 0 | Status: SHIPPED | OUTPATIENT
Start: 2023-05-09

## 2023-05-09 NOTE — TELEPHONE ENCOUNTER
79year old patient last seen in the office on 3/28/2022 for ae and patient has next appointment on 5/30/2023 for ae and mammogram    Prescription sent as per verbal order of MD to get patient to her scheduled appointment

## 2023-05-30 ENCOUNTER — OFFICE VISIT (OUTPATIENT)
Age: 68
End: 2023-05-30
Payer: MEDICARE

## 2023-05-30 ENCOUNTER — APPOINTMENT (OUTPATIENT)
Facility: HOSPITAL | Age: 68
End: 2023-05-30
Payer: MEDICARE

## 2023-05-30 ENCOUNTER — HOSPITAL ENCOUNTER (EMERGENCY)
Facility: HOSPITAL | Age: 68
Discharge: HOME | End: 2023-05-30
Attending: STUDENT IN AN ORGANIZED HEALTH CARE EDUCATION/TRAINING PROGRAM
Payer: MEDICARE

## 2023-05-30 VITALS
HEART RATE: 80 BPM | DIASTOLIC BLOOD PRESSURE: 75 MMHG | RESPIRATION RATE: 16 BRPM | OXYGEN SATURATION: 97 % | TEMPERATURE: 98.5 F | SYSTOLIC BLOOD PRESSURE: 121 MMHG

## 2023-05-30 VITALS — BODY MASS INDEX: 28.28 KG/M2 | WEIGHT: 175.2 LBS | DIASTOLIC BLOOD PRESSURE: 66 MMHG | SYSTOLIC BLOOD PRESSURE: 110 MMHG
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
## 2023-05-30 DIAGNOSIS — S09.90XA INJURY OF HEAD, INITIAL ENCOUNTER: Primary | ICD-10-CM

## 2023-05-30 DIAGNOSIS — S16.1XXA STRAIN OF NECK MUSCLE, INITIAL ENCOUNTER: ICD-10-CM

## 2023-05-30 DIAGNOSIS — Z01.419 ENCOUNTER FOR GYNECOLOGICAL EXAMINATION (GENERAL) (ROUTINE) WITHOUT ABNORMAL FINDINGS: Primary | ICD-10-CM

## 2023-05-30 PROCEDURE — 3074F SYST BP LT 130 MM HG: CPT | Performed by: OBSTETRICS & GYNECOLOGY

## 2023-05-30 PROCEDURE — 70450 CT HEAD/BRAIN W/O DYE: CPT

## 2023-05-30 PROCEDURE — 3078F DIAST BP <80 MM HG: CPT | Performed by: OBSTETRICS & GYNECOLOGY

## 2023-05-30 PROCEDURE — 72125 CT NECK SPINE W/O DYE: CPT

## 2023-05-30 PROCEDURE — 99284 EMERGENCY DEPT VISIT MOD MDM: CPT

## 2023-05-30 PROCEDURE — G0101 CA SCREEN;PELVIC/BREAST EXAM: HCPCS | Performed by: OBSTETRICS & GYNECOLOGY

## 2023-05-30 ASSESSMENT — ENCOUNTER SYMPTOMS
SHORTNESS OF BREATH: 0
EYE PAIN: 0
DIARRHEA: 0
NAUSEA: 0
VOMITING: 0
ABDOMINAL PAIN: 0
COUGH: 0
EYE REDNESS: 0

## 2023-05-30 NOTE — ED TRIAGE NOTES
Pt was seen for well visit at GYN office, leaving office fell down approx 4-5 stairs, hitting head. Pt denies blood thinners, denies LOC. Pt found by hospital staff and helped into wheelchair. Pt a&ox4. Pt denies chest pain and SOB. Complaints of head pain.

## 2023-05-30 NOTE — ED PROVIDER NOTES
HENT:      Head: Normocephalic. Comments: Mild tenderness noted to the posterior scalp with small cephalhematoma however no open wounds      No haley sign or raccoon eyes  Midface is stable  No epistaxis         Nose: Nose normal.      Mouth/Throat:      Mouth: Mucous membranes are moist.   Eyes:      Extraocular Movements: Extraocular movements intact. Pupils: Pupils are equal, round, and reactive to light. Neck:      Comments: Mild midline c spine ttp--c collar placed  Cardiovascular:      Rate and Rhythm: Normal rate and regular rhythm. Pulses: Normal pulses. Heart sounds: No murmur heard. Pulmonary:      Effort: Pulmonary effort is normal. No respiratory distress. Breath sounds: Normal breath sounds. Abdominal:      General: There is no distension. Palpations: Abdomen is soft. Tenderness: There is no abdominal tenderness. Musculoskeletal:         General: Normal range of motion. Right lower leg: No edema. Left lower leg: No edema. Comments: No T/L spine tenderness  No chest wall tenderness, no crepitus, no flail segment  Upper and lower extremities fully ranged, visualized, and palpated without tenderness or deformity. No snuff box tenderness or pain with axial loading of the thumb. The hips are non-tender with bilateral compression  Pelvis is stable  Ambulating without difficulty     Skin:     General: Skin is warm and dry. Capillary Refill: Capillary refill takes less than 2 seconds. Neurological:      General: No focal deficit present. Mental Status: She is alert and oriented to person, place, and time.    Psychiatric:         Mood and Affect: Mood normal.         Behavior: Behavior normal.       DIAGNOSTIC RESULTS     EKG: All EKG's are interpreted by the Emergency Department Physician who either signs or Co-signs this chart in the absence of a cardiologist.        RADIOLOGY:   Interpretation per the Radiologist below, if available at

## 2023-05-30 NOTE — PROGRESS NOTES
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 ,  79 y.o. female   No LMP recorded. Patient is postmenopausal.    She presents for her annual checkup. She is having no problems. Can't afford MAD Incubator at $393 per month but it works. Menstrual status: The patient is not having menses. Hormonal status:  She reports no perimenstrual type symptoms. She is not having vasomotor symptoms. The patient is  using MImvey HRT. Sexual history:    She  has no history on file for sexual activity. Per Nursing Note:  Last Pap: normal obtained 2 year(s) ago. She does not have a history of MONTSERRAT 2, 3 or cervical cancer. Last Mammogram: had a recent mammogram 7 months ago  which was negative for malignancy. .   Last Bone Density: normal obtained 5 year(s) ago. Last colonoscopy: normal obtained 3 year(s) ago.     Past Medical History:   Diagnosis Date    Atypical ductal hyperplasia of breast 2012    Colitis     Depression with anxiety 8/8/2016    GERD (gastroesophageal reflux disease) past week    reflux only occasionally    Hormone replacement therapy     Hx of bone density study 10/19/18, 2008    Normal, repeat 10 years    Hypertension     IBS (irritable bowel syndrome)     Lichen sclerosus et atrophicus of the vulva     Menopausal syndrome     Neck pain, chronic     Other ill-defined conditions(829.89)     old neck injury/pain comes & goes    Seasonal allergies     Unspecified adverse effect of anesthesia     C/O \"waking up\" during one SX    Unspecified adverse effect of anesthesia      hard time waking up with one SX    Urinary incontinence      Past Surgical History:   Procedure Laterality Date    BIOPSY OF BREAST, INCISIONAL  9/2012    RIGHT for ADH    BREAST BIOPSY Right 09/11/2012    US guided-Atypical Ductal Hyperplasia    BREAST BIOPSY Right 06/13/2011    US guided-Stromal fibrosis w/benign mammary ducts and lobules    BREAST BIOPSY Right 09/19/2012    Surgical

## 2023-06-06 NOTE — TELEPHONE ENCOUNTER
This nurse attempted to reach the patient to find out where she wants her refill to be sent and if she want a year worth to that pharmacy    Vocice mail message and my chart message sent

## 2023-06-06 NOTE — TELEPHONE ENCOUNTER
Tami Gordillo MD  to Me    HW    10:07 AM  Does she just want one sent to [facility] in [place] or a years refill to there or somewhere else?      Patient was reached and she would like a years worth sent to her local [facility]    Pharmacy updated to pended medication    Please amen/sign    Thank you

## 2023-06-06 NOTE — TELEPHONE ENCOUNTER
79year old patient last seen in the office on 5/30/2023    Please advise regarding requested refill last sent on 5/30/2023 for one packet    Please amend/sign/refuse    Thank you

## 2023-06-07 RX ORDER — ESTRADIOL AND NORETHINDRONE ACETATE .5; .1 MG/1; MG/1
TABLET ORAL
Qty: 90 TABLET | Refills: 3 | Status: SHIPPED | OUTPATIENT
Start: 2023-06-07

## 2023-06-26 ENCOUNTER — TRANSCRIBE ORDERS (OUTPATIENT)
Facility: HOSPITAL | Age: 68
End: 2023-06-26

## 2023-06-26 DIAGNOSIS — Z12.31 VISIT FOR SCREENING MAMMOGRAM: Primary | ICD-10-CM

## 2023-10-23 ENCOUNTER — OFFICE VISIT (OUTPATIENT)
Age: 68
End: 2023-10-23
Payer: MEDICARE

## 2023-10-23 VITALS — BODY MASS INDEX: 29.28 KG/M2 | WEIGHT: 181.4 LBS | DIASTOLIC BLOOD PRESSURE: 70 MMHG | SYSTOLIC BLOOD PRESSURE: 109 MMHG

## 2023-10-23 DIAGNOSIS — R31.9 HEMATURIA, UNSPECIFIED TYPE: Primary | ICD-10-CM

## 2023-10-23 LAB
BILIRUBIN, URINE, POC: NEGATIVE
BLOOD URINE, POC: NEGATIVE
GLUCOSE URINE, POC: NEGATIVE
KETONES, URINE, POC: NEGATIVE
LEUKOCYTE ESTERASE, URINE, POC: NEGATIVE
NITRITE, URINE, POC: NEGATIVE
PH, URINE, POC: 6.5 (ref 4.6–8)
PROTEIN,URINE, POC: NEGATIVE
SPECIFIC GRAVITY, URINE, POC: 1000 (ref 1–1.03)
URINALYSIS CLARITY, POC: CLEAR
URINALYSIS COLOR, POC: YELLOW
UROBILINOGEN, POC: NORMAL

## 2023-10-23 PROCEDURE — G8399 PT W/DXA RESULTS DOCUMENT: HCPCS | Performed by: OBSTETRICS & GYNECOLOGY

## 2023-10-23 PROCEDURE — 99213 OFFICE O/P EST LOW 20 MIN: CPT | Performed by: OBSTETRICS & GYNECOLOGY

## 2023-10-23 PROCEDURE — 3074F SYST BP LT 130 MM HG: CPT | Performed by: OBSTETRICS & GYNECOLOGY

## 2023-10-23 PROCEDURE — G8484 FLU IMMUNIZE NO ADMIN: HCPCS | Performed by: OBSTETRICS & GYNECOLOGY

## 2023-10-23 PROCEDURE — 3017F COLORECTAL CA SCREEN DOC REV: CPT | Performed by: OBSTETRICS & GYNECOLOGY

## 2023-10-23 PROCEDURE — G8427 DOCREV CUR MEDS BY ELIG CLIN: HCPCS | Performed by: OBSTETRICS & GYNECOLOGY

## 2023-10-23 PROCEDURE — 3078F DIAST BP <80 MM HG: CPT | Performed by: OBSTETRICS & GYNECOLOGY

## 2023-10-23 PROCEDURE — 1090F PRES/ABSN URINE INCON ASSESS: CPT | Performed by: OBSTETRICS & GYNECOLOGY

## 2023-10-23 PROCEDURE — 1123F ACP DISCUSS/DSCN MKR DOCD: CPT | Performed by: OBSTETRICS & GYNECOLOGY

## 2023-10-23 PROCEDURE — 4004F PT TOBACCO SCREEN RCVD TLK: CPT | Performed by: OBSTETRICS & GYNECOLOGY

## 2023-10-23 PROCEDURE — G8419 CALC BMI OUT NRM PARAM NOF/U: HCPCS | Performed by: OBSTETRICS & GYNECOLOGY

## 2023-10-23 PROCEDURE — 81003 URINALYSIS AUTO W/O SCOPE: CPT | Performed by: OBSTETRICS & GYNECOLOGY

## 2023-10-23 RX ORDER — NITROFURANTOIN 25; 75 MG/1; MG/1
100 CAPSULE ORAL 2 TIMES DAILY
Qty: 14 CAPSULE | Refills: 0 | Status: SHIPPED | OUTPATIENT
Start: 2023-10-23 | End: 2023-10-30

## 2023-10-23 NOTE — PROGRESS NOTES
Courtney Maurice is a 79 y.o. female presents for a problem visit. No chief complaint on file. No LMP recorded. Patient is postmenopausal.  Birth Control: post menopausal status. Last Pap: normal obtained 2 year(s) ago. The patient is reporting having:  hematuria  for 2 weeks. She reports the symptoms are has improved. Aggravating factors include none. And alleviating factors include none. 1. Have you been to the ER, urgent care clinic, or hospitalized since your last visit? No    2. Have you seen or consulted any other health care providers outside of the [address] [place] [address] Avenue since your last visit? No    Examination chaperoned by Aria Ojeda LPN.
ductal hyperplasia, Fibrocystic change    BREAST SURGERY  11    RIGHT ductal excision    CATARACT REMOVAL      ,     ENDOMETRIAL ABLATION  2007    w/ D&C    GYN  1994    PROCEDURE FOR LICHEN SCLEROSIS    GYN      Vulvoplasty    HEENT      LASIK    HEENT      Eye surgery for lazy eyes    ORTHOPEDIC SURGERY  3/2011    Bone spur RIGHT FOOT    ORTHOPEDIC SURGERY  1988    Arthroscopy of the knee LEFT    TONSILLECTOMY  1963    UROLOGICAL SURGERY  10/25/13    Urodynamics       Social History     Occupational History    Not on file   Tobacco Use    Smoking status: Never    Smokeless tobacco: Never   Substance and Sexual Activity    Alcohol use: Yes     Alcohol/week: 3.3 standard drinks of alcohol    Drug use: No    Sexual activity: Not on file       Family History   Problem Relation Age of Onset    Heart Disease Father     Cancer Mother         LIVER    Osteoporosis Sister     Uterine Cancer Other         Aunt    Breast Cancer Maternal Grandmother 36         of \"bone cancer\" many yrs later    Headache Mother        Allergies   Allergen Reactions    Meperidine Other (See Comments)     Arm swelling       Prior to Admission medications    Medication Sig Start Date End Date Taking?  Authorizing Provider   Estradiol-Norethindrone Acet 0.5-0.1 MG TABS TAKE 1 TABLET BY MOUTH EVERY DAY 23  Yes Jeet Lucas MD   amLODIPine (NORVASC) 5 MG tablet Take by mouth daily   Yes Automatic Reconciliation, Ar   buPROPion HBr (APLENZIN) 522 MG TB24 ceived the following from [facility]: Outside name: APLENZIN 522 mg Tb24 20  Yes Automatic Reconciliation, Ar   buPROPion (WELLBUTRIN XL) 300 MG extended release tablet Take by mouth 8/15/17  Yes Automatic Reconciliation, Ar   vitamin D 25 MCG (1000 UT) CAPS Take by mouth daily   Yes Automatic Reconciliation, Ar   famotidine (PEPCID) 20 MG tablet Take by mouth 2 times daily   Yes Automatic Reconciliation, Ar   halobetasol (ULTRAVATE) 0.05 % cream

## 2023-10-26 LAB — BACTERIA UR CULT: ABNORMAL

## 2023-11-01 RX ORDER — SOLIFENACIN SUCCINATE 10 MG/1
10 TABLET, FILM COATED ORAL DAILY
Qty: 90 TABLET | Refills: 2 | Status: SHIPPED | OUTPATIENT
Start: 2023-11-01

## 2023-11-01 NOTE — TELEPHONE ENCOUNTER
To patient identifiers used      79year old patient last seen in the office on 5/30/2023 for ae    Patient is calling to get a refill of her prescription for solifenacin (VESICARE) 10 mg tablet      Prescription pended to get patient to when she is due to be seen for ae 5/2024    Please amend/sign    Thank you

## 2023-11-17 NOTE — TELEPHONE ENCOUNTER
Patient called requesting a 90 day refill on the Lopreeza to be sent to the [facility]. This nurse refilled until AE 8/2017.
36.8

## 2023-11-22 ENCOUNTER — HOSPITAL ENCOUNTER (OUTPATIENT)
Facility: HOSPITAL | Age: 68
Discharge: HOME | End: 2023-11-25
Attending: OBSTETRICS & GYNECOLOGY
Payer: MEDICARE

## 2023-11-22 VITALS — HEIGHT: 66 IN | BODY MASS INDEX: 29.09 KG/M2 | WEIGHT: 181 LBS

## 2023-11-22 DIAGNOSIS — Z12.31 VISIT FOR SCREENING MAMMOGRAM: ICD-10-CM

## 2023-11-22 PROCEDURE — 77063 BREAST TOMOSYNTHESIS BI: CPT

## 2023-11-28 DIAGNOSIS — R92.8 ABNORMAL MAMMOGRAM: Primary | ICD-10-CM

## 2023-11-30 ENCOUNTER — TELEPHONE (OUTPATIENT)
Age: 68
End: 2023-11-30

## 2023-11-30 NOTE — TELEPHONE ENCOUNTER
PT name and  verified    78 yo last ov 10/23/23    PT calling stating per her last ov and urine culture 10/23/23, she was suppose to come in and have her culture repeated and complete Macrobid rx. MD advised on 10/26/23 for PT to return in 2 weeks for repeat culture, relayed to PT it had been 5 weeks, and she did not realize the length of time in between, PT states she had been oot. PT states she does have some lower back pain, but with her last ov, she did not have any symptoms, just \"blood in her urine\". PT scheduled for nurse visit for 23 at [address] [place] [address] for urine culture. Please advise  Does she need ov? Instead of nurse visit for urine sample?     Thank you

## 2023-12-01 ENCOUNTER — OFFICE VISIT (OUTPATIENT)
Age: 68
End: 2023-12-01

## 2023-12-01 ENCOUNTER — HOSPITAL ENCOUNTER (OUTPATIENT)
Facility: HOSPITAL | Age: 68
End: 2023-12-01
Attending: INTERNAL MEDICINE
Payer: MEDICARE

## 2023-12-01 DIAGNOSIS — R05.3 UNEXPLAINED CHRONIC COUGH: ICD-10-CM

## 2023-12-01 DIAGNOSIS — N39.0 URINARY TRACT INFECTION WITHOUT HEMATURIA, SITE UNSPECIFIED: Primary | ICD-10-CM

## 2023-12-01 PROCEDURE — 71250 CT THORAX DX C-: CPT

## 2023-12-05 LAB — BACTERIA UR CULT: NORMAL

## 2024-01-19 ENCOUNTER — HOSPITAL ENCOUNTER (OUTPATIENT)
Facility: HOSPITAL | Age: 69
End: 2024-01-19
Attending: OBSTETRICS & GYNECOLOGY
Payer: MEDICARE

## 2024-01-19 ENCOUNTER — TELEPHONE (OUTPATIENT)
Age: 69
End: 2024-01-19

## 2024-01-19 VITALS — BODY MASS INDEX: 28.12 KG/M2 | HEIGHT: 66 IN | WEIGHT: 175 LBS

## 2024-01-19 DIAGNOSIS — R92.8 ABNORMAL MAMMOGRAM OF LEFT BREAST: ICD-10-CM

## 2024-01-19 DIAGNOSIS — N63.0 MASS OF BREAST, UNSPECIFIED LATERALITY: Primary | ICD-10-CM

## 2024-01-19 PROCEDURE — G0279 TOMOSYNTHESIS, MAMMO: HCPCS

## 2024-01-19 PROCEDURE — 76642 ULTRASOUND BREAST LIMITED: CPT

## 2024-01-19 NOTE — TELEPHONE ENCOUNTER
Two patient identifiers used          68 year old patient last seen in the office on 5/30/2023 for ae    Patient had additional view done today , see results and is currently scheduled per patient to see Dr. Contreras    Patient states she will be needing a biopsy and possible surgery    This nurse see order for biopsy     ? Does she need referral to breast surgery , she does have appointment as noted above on Tuesday 1/23      Referral pended if needed    Please amend/sign or refuse    Thank you

## 2024-01-19 NOTE — TELEPHONE ENCOUNTER
This nurse attempted to reach the patient and left a detailed message that the referral has been signed by MD and she should be good to go for her upcoming appointment

## 2024-01-23 ENCOUNTER — OFFICE VISIT (OUTPATIENT)
Age: 69
End: 2024-01-23

## 2024-01-23 ENCOUNTER — CLINICAL DOCUMENTATION (OUTPATIENT)
Age: 69
End: 2024-01-23

## 2024-01-23 ENCOUNTER — HOSPITAL ENCOUNTER (OUTPATIENT)
Facility: HOSPITAL | Age: 69
Setting detail: SPECIMEN
Discharge: HOME | End: 2024-01-26

## 2024-01-23 VITALS — HEIGHT: 66 IN | WEIGHT: 175 LBS | BODY MASS INDEX: 28.12 KG/M2

## 2024-01-23 DIAGNOSIS — C50.212 MALIGNANT NEOPLASM OF UPPER-INNER QUADRANT OF LEFT FEMALE BREAST, UNSPECIFIED ESTROGEN RECEPTOR STATUS (HCC): ICD-10-CM

## 2024-01-23 DIAGNOSIS — R92.8 ABNORMAL ULTRASOUND OF BREAST: ICD-10-CM

## 2024-01-23 DIAGNOSIS — R92.8 ABNORMAL MAMMOGRAM: Primary | ICD-10-CM

## 2024-01-23 NOTE — PROGRESS NOTES
ALPRAZolam (XANAX) 0.5 MG tablet Take by mouth.  Patient not taking: Reported on 10/23/2023    Automatic Reconciliation, Ar      Allergies   Allergen Reactions    Meperidine Other (See Comments)     Arm swelling       Physical Exam  Cardiovascular:      Rate and Rhythm: Normal rate and regular rhythm.   Pulmonary:      Effort: Pulmonary effort is normal.      Breath sounds: Normal breath sounds.   Chest:   Breasts:     Right: No swelling, mass, skin change or tenderness.      Left: Mass (2cm hard fixed mass 10:00 1/3) present. No swelling, skin change or tenderness.       Lymphadenopathy:      Upper Body:      Right upper body: No axillary adenopathy.      Left upper body: No axillary adenopathy.   Neurological:      Mental Status: She is alert.      US - Guided Core Biopsy  Indication : Left Breast mass upper inner quadrant.   palpable.  Ultrasound Findings: Left breast ultrasound: 10:00, 5 cm from the nipple, irregular hypoechoic antiparallel mass with posterior acoustic shadowing measures 1.5 x 0.9 x 0.8 cm.  Prep : alcohol.   Anesthesia : 1% lidocaine, 4cc.    Device : The hand-held 12 gauge BARD needle was inserted through the lesion and captured tissue with real-time Ultrasound Confirmation..   Core Sampling :  2 cores were obtained.    Marker: clip placed   Dressing : Steristrips, gauze and tape.   Instructions : Remove gauze this evening.  Remove steristrips in one week.   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 Abnormal mammogram        2. Abnormal ultrasound of breast        I spent 45 minutes reviewing previous notes and test results, face to face with the patient discussing diagnosis and treatment options, and documenting today's visit.     LEFT breast mass biopsied  If + will order breast MRI  Discussed the possibility of

## 2024-01-23 NOTE — PROGRESS NOTES
[facility]   OFFICE PROCEDURE PROGRESS NOTE        Chart reviewed for the following:   I, Dr. Nikki Contreras, have reviewed the History, Physical and updated the Allergic reactions for Lizabethmarisela Billings     TIME OUT performed immediately prior to start of procedure:   I, Dr. Nikki Contreras, have performed the following reviews on Lizabeth Billings prior to the start of the procedure:            * Patient was identified by name and date of birth   * Agreement on procedure being performed was verified  * Risks and Benefits explained to the patient  * Procedure site verified and marked as necessary  * Patient was positioned for comfort  * Consent was signed and verified     Time: 3:25pm      Date of procedure: 1/23/2024    Procedure performed by:  Nikki Contreras MD    Provider assisted by: Debbie VILLAGOMEZ    Patient assisted by: nursing attendant    How tolerated by patient: tolerated the procedure well with no complications    Post Procedural Pain Scale: 0    Comments: Patient tolerated the procedure well without complications.  Denies pain post biopsy.

## 2024-01-23 NOTE — H&P (VIEW-ONLY)
HISTORY OF PRESENT ILLNESS  Lizabeth Billings is a 68 y.o. female     HPI NEW (former) patient consult referred by Dr. Shakir De La Cruz for new LEFT breast mass seen on screening mammogram.  Pt does not feel a mass.     Breast history-   9/2012 RIGHT breast ADH excisional biopsy     Family History:  Maternal great grandmother had breast cancer dx 45  Maternal Aunt had uterine cancer     Breast imaging-   Mammogram Result (most recent):  [facility] DIGITAL DIAGNOSTIC UNILATERAL LEFT 01/19/2024    Narrative  EXAM: [facility] DIGITAL DIAGNOSTIC UNILATERAL LEFT, US BREAST LIMITED LEFT    INDICATION: Callback from screening mammogram for possible mass in the left  breast. No immediate family history. Taking hormone replacement therapy.    COMPARISON: Screening mammography on 11/22/2023. Other Mammography dating back  to 8/8/2016.    TECHNIQUE: Diagnostic unilateral left digital mediolateral and spot compression  MLO and CC views. Technique includes three-dimensional tomosynthesis. Computer  aided detection (CAD) was utilized. Left breast Limited ultrasound (2 separate  studies reported together).    BREAST COMPOSITION: The breast tissue is heterogeneously dense, which could  obscure detection of small masses.    FINDINGS: Irregular mass with surrounding architectural distortion in the middle  third of the medial left breast 9-10:00 measures up to 1.3 cm. Single tiny  calcification. No skin thickening or nipple retraction.    Left breast ultrasound: 9:00, 5 cm from the nipple, irregular hypoechoic  antiparallel mass with posterior acoustic shadowing measures 1.5 x 0.9 x 0.8 cm.  Adjacent moderate blood flow. This corresponds to the mammographic finding.    Impression  1.5 cm suspicious mass in the left breast 9:00 position.    Recommendation:  Ultrasound-guided biopsy of left breast mass.    BI-RADS Assessment Category 5: Highly suggestive of malignancy- Appropriate  action should be taken.    I gave results to the patient at the    ALPRAZolam (XANAX) 0.5 MG tablet Take by mouth.  Patient not taking: Reported on 10/23/2023    Automatic Reconciliation, Ar      Allergies   Allergen Reactions    Meperidine Other (See Comments)     Arm swelling       Physical Exam  Cardiovascular:      Rate and Rhythm: Normal rate and regular rhythm.   Pulmonary:      Effort: Pulmonary effort is normal.      Breath sounds: Normal breath sounds.   Chest:   Breasts:     Right: No swelling, mass, skin change or tenderness.      Left: Mass (2cm hard fixed mass 10:00 1/3) present. No swelling, skin change or tenderness.       Lymphadenopathy:      Upper Body:      Right upper body: No axillary adenopathy.      Left upper body: No axillary adenopathy.   Neurological:      Mental Status: She is alert.      US - Guided Core Biopsy  Indication : Left Breast mass upper inner quadrant.   palpable.  Ultrasound Findings: Left breast ultrasound: 10:00, 5 cm from the nipple, irregular hypoechoic antiparallel mass with posterior acoustic shadowing measures 1.5 x 0.9 x 0.8 cm.  Prep : alcohol.   Anesthesia : 1% lidocaine, 4cc.    Device : The hand-held 12 gauge BARD needle was inserted through the lesion and captured tissue with real-time Ultrasound Confirmation..   Core Sampling :  2 cores were obtained.    Marker: clip placed   Dressing : Steristrips, gauze and tape.   Instructions : Remove gauze this evening.  Remove steristrips in one week.   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 Abnormal mammogram        2. Abnormal ultrasound of breast        I spent 45 minutes reviewing previous notes and test results, face to face with the patient discussing diagnosis and treatment options, and documenting today's visit.     LEFT breast mass biopsied  If + will order breast MRI  Discussed the possibility of

## 2024-01-29 PROBLEM — C50.912 BREAST CANCER, LEFT (HCC): Status: ACTIVE | Noted: 2024-01-29

## 2024-02-01 ENCOUNTER — TELEPHONE (OUTPATIENT)
Age: 69
End: 2024-02-01

## 2024-02-01 ENCOUNTER — HOSPITAL ENCOUNTER (OUTPATIENT)
Age: 69
Discharge: HOME | End: 2024-02-01
Attending: SURGERY
Payer: MEDICARE

## 2024-02-01 ENCOUNTER — PREP FOR PROCEDURE (OUTPATIENT)
Age: 69
End: 2024-02-01

## 2024-02-01 DIAGNOSIS — L91.0 SCARRING, HYPERTROPHIC: ICD-10-CM

## 2024-02-01 DIAGNOSIS — C50.212 MALIGNANT NEOPLASM OF UPPER-INNER QUADRANT OF LEFT FEMALE BREAST, UNSPECIFIED ESTROGEN RECEPTOR STATUS (HCC): ICD-10-CM

## 2024-02-01 PROCEDURE — 6360000004 HC RX CONTRAST MEDICATION: Performed by: RADIOLOGY

## 2024-02-01 PROCEDURE — A9585 GADOBUTROL INJECTION: HCPCS | Performed by: RADIOLOGY

## 2024-02-01 PROCEDURE — C8908 MRI W/O FOL W/CONT, BREAST,: HCPCS

## 2024-02-01 RX ORDER — GADOBUTROL 604.72 MG/ML
8 INJECTION INTRAVENOUS
Status: COMPLETED | OUTPATIENT
Start: 2024-02-01 | End: 2024-02-01

## 2024-02-01 RX ADMIN — GADOBUTROL 8 ML: 604.72 INJECTION INTRAVENOUS at 10:55

## 2024-02-02 ENCOUNTER — TELEPHONE (OUTPATIENT)
Age: 69
End: 2024-02-02

## 2024-02-02 NOTE — TELEPHONE ENCOUNTER
Patient Surgery Information Sheet      Patient Name:  Lizabeth Billings  Surgery Date:  February 7, 2024    Type of Surgery:  LEFT BREAST LUMPECTOMY AND LEFT BREAST SENTINEL NODE BIOPSY WITH BLUE DYE    Estimated arrival time 7:00 AM    Arrival time will be confirmed the afternoon before your surgery.    Pre-procedure: Not Applicable    Pre-Operative Testing Department will call to schedule pre-op testing appointment if needed before surgery    Hospital:  [facility]  Address:  [address] [place] [id number]  Check in location:  Through the Main Entrance of [place], Take the elevator on the left side to the second floor on your left as you step out of the elevator    Correction: take a RIGHT off the elevator when you get to the second floor.    Pre-Operative Instructions:  Will be given at the pre-op appointment.    Special Instructions if needed:     NPO (nothing by mouth) or drinking after midnight the night before Surgery  Patient may shower the morning of, do not use an lotion, deodorant, powders, perfumes or makeup  Patient will need  the morning of surgery     Surgery Scheduler:  Norma Esqueda

## 2024-02-05 ENCOUNTER — HOSPITAL ENCOUNTER (OUTPATIENT)
Facility: HOSPITAL | Age: 69
Discharge: HOME | End: 2024-02-08
Payer: MEDICARE

## 2024-02-05 VITALS
TEMPERATURE: 97.3 F | OXYGEN SATURATION: 97 % | HEIGHT: 66 IN | SYSTOLIC BLOOD PRESSURE: 126 MMHG | BODY MASS INDEX: 28.61 KG/M2 | HEART RATE: 92 BPM | DIASTOLIC BLOOD PRESSURE: 84 MMHG | RESPIRATION RATE: 16 BRPM | WEIGHT: 178 LBS

## 2024-02-05 LAB
ANION GAP SERPL CALC-SCNC: 3 MMOL/L (ref 5–15)
BUN SERPL-MCNC: 14 MG/DL (ref 6–20)
BUN/CREAT SERPL: 16 (ref 12–20)
CALCIUM SERPL-MCNC: 9.2 MG/DL (ref 8.5–10.1)
CHLORIDE SERPL-SCNC: 107 MMOL/L (ref 97–108)
CO2 SERPL-SCNC: 29 MMOL/L (ref 21–32)
CREAT SERPL-MCNC: 0.85 MG/DL (ref 0.55–1.02)
EKG ATRIAL RATE: 75 BPM
EKG DIAGNOSIS: NORMAL
EKG P AXIS: 65 DEGREES
EKG P-R INTERVAL: 178 MS
EKG Q-T INTERVAL: 384 MS
EKG QRS DURATION: 74 MS
EKG QTC CALCULATION (BAZETT): 428 MS
EKG R AXIS: 67 DEGREES
EKG T AXIS: 73 DEGREES
EKG VENTRICULAR RATE: 75 BPM
GLUCOSE SERPL-MCNC: 89 MG/DL (ref 65–100)
POTASSIUM SERPL-SCNC: 4.8 MMOL/L (ref 3.5–5.1)
SODIUM SERPL-SCNC: 139 MMOL/L (ref 136–145)

## 2024-02-05 PROCEDURE — 80048 BASIC METABOLIC PNL TOTAL CA: CPT

## 2024-02-05 PROCEDURE — 93005 ELECTROCARDIOGRAM TRACING: CPT | Performed by: NURSE PRACTITIONER

## 2024-02-05 PROCEDURE — 36415 COLL VENOUS BLD VENIPUNCTURE: CPT

## 2024-02-05 PROCEDURE — 93010 ELECTROCARDIOGRAM REPORT: CPT | Performed by: SPECIALIST

## 2024-02-05 RX ORDER — OMEPRAZOLE 40 MG/1
40 CAPSULE, DELAYED RELEASE ORAL EVERY MORNING
COMMUNITY

## 2024-02-05 RX ORDER — BUPROPION HYDROCHLORIDE 150 MG/1
150 TABLET, EXTENDED RELEASE ORAL DAILY
COMMUNITY

## 2024-02-05 NOTE — PERIOP NOTE
[facility]                   [address] [place] [address]   MAIN OR                                  [phone]   MAIN PRE OP                          [phone]                                                                                AMBULATORY PRE OP          [phone]  PRE-ADMISSION TESTING    [phone]     Surgery Date:  Wed 2/7       Is surgery arrival time given by surgeon?  YES  If “NO”, [facility] staff will call you between 3 and 7pm the day before your surgery with your arrival time. (If your surgery is on a Monday, we will call you the Friday before.)    Call [phone] after 7pm Monday-Friday if you did not receive this call.    INSTRUCTIONS BEFORE YOUR SURGERY   When You  Arrive Arrive at the 2nd Floor Admitting Desk on the day of your surgery  Have your insurance card, photo ID, and any copayment (if needed)   Food   and   Drink NO food or drink after midnight the night before surgery    This means NO water, gum, mints, coffee, juice, etc.  No alcohol (beer, wine, liquor) 24 hours before and after surgery   Medications to   TAKE   Morning of Surgery MEDICATIONS TO TAKE THE MORNING OF SURGERY WITH A SIP OF WATER:   Amlodipine  Bupropion  Omeprazole  Sertraline   Medications  To  STOP      7 days before surgery Non-Steroidal anti-inflammatory Drugs (NSAID's): for example, Ibuprofen (Advil, Motrin), Naproxen (Aleve)  Aspirin, if taking for pain   Herbal supplements, vitamins, and fish oil  Other:  (Pain medications not listed above, including Tylenol may be taken)       Bathing Clothing  Jewelry  Valuables     If you shower the morning of surgery, please do not apply anything to your skin (lotions, powders, deodorant, or makeup, especially mascara)  Follow Chlorhexidine Care Fusion body wash instructions provided to you during PAT appointment. Begin 3 days prior to surgery.  Do not shave or trim anywhere 24 hours before surgery  Wear your

## 2024-02-07 ENCOUNTER — ANESTHESIA (OUTPATIENT)
Facility: HOSPITAL | Age: 69
End: 2024-02-07
Payer: MEDICARE

## 2024-02-07 ENCOUNTER — ANESTHESIA EVENT (OUTPATIENT)
Facility: HOSPITAL | Age: 69
End: 2024-02-07
Payer: MEDICARE

## 2024-02-07 ENCOUNTER — HOSPITAL ENCOUNTER (OUTPATIENT)
Facility: HOSPITAL | Age: 69
Setting detail: OUTPATIENT SURGERY
Discharge: HOME | End: 2024-02-07
Attending: SURGERY | Admitting: SURGERY
Payer: MEDICARE

## 2024-02-07 VITALS
HEART RATE: 100 BPM | HEIGHT: 67 IN | DIASTOLIC BLOOD PRESSURE: 84 MMHG | TEMPERATURE: 97.6 F | SYSTOLIC BLOOD PRESSURE: 122 MMHG | OXYGEN SATURATION: 95 % | BODY MASS INDEX: 27.61 KG/M2 | WEIGHT: 175.93 LBS | RESPIRATION RATE: 18 BRPM

## 2024-02-07 PROCEDURE — 38900 IO MAP OF SENT LYMPH NODE: CPT | Performed by: SURGERY

## 2024-02-07 PROCEDURE — 2500000003 HC RX 250 WO HCPCS: Performed by: SURGERY

## 2024-02-07 PROCEDURE — 7100000001 HC PACU RECOVERY - ADDTL 15 MIN: Performed by: SURGERY

## 2024-02-07 PROCEDURE — 76998 US GUIDE INTRAOP: CPT | Performed by: SURGERY

## 2024-02-07 PROCEDURE — 3700000000 HC ANESTHESIA ATTENDED CARE: Performed by: SURGERY

## 2024-02-07 PROCEDURE — 7100000011 HC PHASE II RECOVERY - ADDTL 15 MIN: Performed by: SURGERY

## 2024-02-07 PROCEDURE — 3700000001 HC ADD 15 MINUTES (ANESTHESIA): Performed by: SURGERY

## 2024-02-07 PROCEDURE — 38525 BIOPSY/REMOVAL LYMPH NODES: CPT | Performed by: SURGERY

## 2024-02-07 PROCEDURE — 2580000003 HC RX 258: Performed by: ANESTHESIOLOGY

## 2024-02-07 PROCEDURE — 7100000000 HC PACU RECOVERY - FIRST 15 MIN: Performed by: SURGERY

## 2024-02-07 PROCEDURE — 6360000002 HC RX W HCPCS: Performed by: STUDENT IN AN ORGANIZED HEALTH CARE EDUCATION/TRAINING PROGRAM

## 2024-02-07 PROCEDURE — 2580000003 HC RX 258: Performed by: STUDENT IN AN ORGANIZED HEALTH CARE EDUCATION/TRAINING PROGRAM

## 2024-02-07 PROCEDURE — 7100000010 HC PHASE II RECOVERY - FIRST 15 MIN: Performed by: SURGERY

## 2024-02-07 PROCEDURE — 19301 PARTIAL MASTECTOMY: CPT | Performed by: SURGERY

## 2024-02-07 PROCEDURE — 88307 TISSUE EXAM BY PATHOLOGIST: CPT

## 2024-02-07 PROCEDURE — 2720000010 HC SURG SUPPLY STERILE: Performed by: SURGERY

## 2024-02-07 PROCEDURE — 6360000002 HC RX W HCPCS: Performed by: ANESTHESIOLOGY

## 2024-02-07 PROCEDURE — 3600000003 HC SURGERY LEVEL 3 BASE: Performed by: SURGERY

## 2024-02-07 PROCEDURE — 2500000003 HC RX 250 WO HCPCS: Performed by: STUDENT IN AN ORGANIZED HEALTH CARE EDUCATION/TRAINING PROGRAM

## 2024-02-07 PROCEDURE — 2709999900 HC NON-CHARGEABLE SUPPLY: Performed by: SURGERY

## 2024-02-07 PROCEDURE — 3600000013 HC SURGERY LEVEL 3 ADDTL 15MIN: Performed by: SURGERY

## 2024-02-07 RX ORDER — SODIUM CHLORIDE, SODIUM LACTATE, POTASSIUM CHLORIDE, CALCIUM CHLORIDE 600; 310; 30; 20 MG/100ML; MG/100ML; MG/100ML; MG/100ML
INJECTION, SOLUTION INTRAVENOUS CONTINUOUS
Status: DISCONTINUED | OUTPATIENT
Start: 2024-02-07 | End: 2024-02-07 | Stop reason: HOSPADM

## 2024-02-07 RX ORDER — ONDANSETRON 2 MG/ML
INJECTION INTRAMUSCULAR; INTRAVENOUS PRN
Status: DISCONTINUED | OUTPATIENT
Start: 2024-02-07 | End: 2024-02-07 | Stop reason: SDUPTHER

## 2024-02-07 RX ORDER — MIDAZOLAM HYDROCHLORIDE 2 MG/2ML
2 INJECTION, SOLUTION INTRAMUSCULAR; INTRAVENOUS
Status: DISCONTINUED | OUTPATIENT
Start: 2024-02-07 | End: 2024-02-07 | Stop reason: HOSPADM

## 2024-02-07 RX ORDER — DEXAMETHASONE SODIUM PHOSPHATE 4 MG/ML
INJECTION, SOLUTION INTRA-ARTICULAR; INTRALESIONAL; INTRAMUSCULAR; INTRAVENOUS; SOFT TISSUE PRN
Status: DISCONTINUED | OUTPATIENT
Start: 2024-02-07 | End: 2024-02-07 | Stop reason: SDUPTHER

## 2024-02-07 RX ORDER — SODIUM CHLORIDE, SODIUM LACTATE, POTASSIUM CHLORIDE, CALCIUM CHLORIDE 600; 310; 30; 20 MG/100ML; MG/100ML; MG/100ML; MG/100ML
INJECTION, SOLUTION INTRAVENOUS CONTINUOUS PRN
Status: DISCONTINUED | OUTPATIENT
Start: 2024-02-07 | End: 2024-02-07 | Stop reason: SDUPTHER

## 2024-02-07 RX ORDER — FENTANYL CITRATE 50 UG/ML
INJECTION, SOLUTION INTRAMUSCULAR; INTRAVENOUS PRN
Status: DISCONTINUED | OUTPATIENT
Start: 2024-02-07 | End: 2024-02-07 | Stop reason: SDUPTHER

## 2024-02-07 RX ORDER — KETOROLAC TROMETHAMINE 30 MG/ML
30 INJECTION, SOLUTION INTRAMUSCULAR; INTRAVENOUS ONCE
Status: COMPLETED | OUTPATIENT
Start: 2024-02-07 | End: 2024-02-07

## 2024-02-07 RX ORDER — ONDANSETRON 2 MG/ML
4 INJECTION INTRAMUSCULAR; INTRAVENOUS
Status: COMPLETED | OUTPATIENT
Start: 2024-02-07 | End: 2024-02-07

## 2024-02-07 RX ORDER — DIPHENHYDRAMINE HYDROCHLORIDE 50 MG/ML
12.5 INJECTION INTRAMUSCULAR; INTRAVENOUS
Status: DISCONTINUED | OUTPATIENT
Start: 2024-02-07 | End: 2024-02-07 | Stop reason: HOSPADM

## 2024-02-07 RX ORDER — FENTANYL CITRATE 50 UG/ML
100 INJECTION, SOLUTION INTRAMUSCULAR; INTRAVENOUS
Status: DISCONTINUED | OUTPATIENT
Start: 2024-02-07 | End: 2024-02-07 | Stop reason: HOSPADM

## 2024-02-07 RX ORDER — BUPIVACAINE HYDROCHLORIDE AND EPINEPHRINE 5; .0091 MG/ML; MG/ML
INJECTION, SOLUTION DENTAL; INFILTRATION PRN
Status: DISCONTINUED | OUTPATIENT
Start: 2024-02-07 | End: 2024-02-07 | Stop reason: HOSPADM

## 2024-02-07 RX ORDER — PROPOFOL 10 MG/ML
INJECTION, EMULSION INTRAVENOUS CONTINUOUS PRN
Status: DISCONTINUED | OUTPATIENT
Start: 2024-02-07 | End: 2024-02-07 | Stop reason: SDUPTHER

## 2024-02-07 RX ORDER — MIDAZOLAM HYDROCHLORIDE 1 MG/ML
INJECTION INTRAMUSCULAR; INTRAVENOUS PRN
Status: DISCONTINUED | OUTPATIENT
Start: 2024-02-07 | End: 2024-02-07 | Stop reason: SDUPTHER

## 2024-02-07 RX ORDER — LIDOCAINE HYDROCHLORIDE 10 MG/ML
1 INJECTION, SOLUTION EPIDURAL; INFILTRATION; INTRACAUDAL; PERINEURAL
Status: DISCONTINUED | OUTPATIENT
Start: 2024-02-07 | End: 2024-02-07 | Stop reason: HOSPADM

## 2024-02-07 RX ADMIN — FENTANYL CITRATE 25 MCG: 50 INJECTION, SOLUTION INTRAMUSCULAR; INTRAVENOUS at 10:12

## 2024-02-07 RX ADMIN — PROPOFOL 90 MCG/KG/MIN: 10 INJECTION, EMULSION INTRAVENOUS at 09:16

## 2024-02-07 RX ADMIN — SODIUM CHLORIDE, POTASSIUM CHLORIDE, SODIUM LACTATE AND CALCIUM CHLORIDE: 600; 310; 30; 20 INJECTION, SOLUTION INTRAVENOUS at 07:22

## 2024-02-07 RX ADMIN — LIDOCAINE HYDROCHLORIDE 60 MG: 20 INJECTION, SOLUTION EPIDURAL; INFILTRATION; INTRACAUDAL; PERINEURAL at 09:15

## 2024-02-07 RX ADMIN — HYDROMORPHONE HYDROCHLORIDE 0.5 MG: 1 INJECTION, SOLUTION INTRAMUSCULAR; INTRAVENOUS; SUBCUTANEOUS at 11:14

## 2024-02-07 RX ADMIN — MIDAZOLAM HYDROCHLORIDE 2 MG: 1 INJECTION, SOLUTION INTRAMUSCULAR; INTRAVENOUS at 09:10

## 2024-02-07 RX ADMIN — SODIUM CHLORIDE, POTASSIUM CHLORIDE, SODIUM LACTATE AND CALCIUM CHLORIDE: 600; 310; 30; 20 INJECTION, SOLUTION INTRAVENOUS at 08:10

## 2024-02-07 RX ADMIN — FENTANYL CITRATE 25 MCG: 50 INJECTION, SOLUTION INTRAMUSCULAR; INTRAVENOUS at 09:51

## 2024-02-07 RX ADMIN — FENTANYL CITRATE 25 MCG: 50 INJECTION, SOLUTION INTRAMUSCULAR; INTRAVENOUS at 09:23

## 2024-02-07 RX ADMIN — KETOROLAC TROMETHAMINE 30 MG: 30 INJECTION, SOLUTION INTRAMUSCULAR at 10:59

## 2024-02-07 RX ADMIN — FENTANYL CITRATE 25 MCG: 50 INJECTION, SOLUTION INTRAMUSCULAR; INTRAVENOUS at 10:19

## 2024-02-07 RX ADMIN — DEXAMETHASONE SODIUM PHOSPHATE 4 MG: 4 INJECTION INTRA-ARTICULAR; INTRALESIONAL; INTRAMUSCULAR; INTRAVENOUS; SOFT TISSUE at 09:38

## 2024-02-07 RX ADMIN — ONDANSETRON 4 MG: 2 INJECTION INTRAMUSCULAR; INTRAVENOUS at 11:13

## 2024-02-07 RX ADMIN — ONDANSETRON 4 MG: 2 INJECTION INTRAMUSCULAR; INTRAVENOUS at 09:38

## 2024-02-07 RX ADMIN — FENTANYL CITRATE 25 MCG: 50 INJECTION, SOLUTION INTRAMUSCULAR; INTRAVENOUS at 09:20

## 2024-02-07 RX ADMIN — PROPOFOL 50 MG: 10 INJECTION, EMULSION INTRAVENOUS at 09:15

## 2024-02-07 RX ADMIN — FENTANYL CITRATE 50 MCG: 50 INJECTION, SOLUTION INTRAMUSCULAR; INTRAVENOUS at 09:16

## 2024-02-07 RX ADMIN — FENTANYL CITRATE 25 MCG: 50 INJECTION, SOLUTION INTRAMUSCULAR; INTRAVENOUS at 09:53

## 2024-02-07 ASSESSMENT — PAIN DESCRIPTION - DESCRIPTORS
DESCRIPTORS: ACHING
DESCRIPTORS: SORE
DESCRIPTORS: ACHING
DESCRIPTORS: ACHING

## 2024-02-07 ASSESSMENT — PAIN DESCRIPTION - ORIENTATION
ORIENTATION: LEFT

## 2024-02-07 ASSESSMENT — PAIN SCALES - GENERAL
PAINLEVEL_OUTOF10: 4
PAINLEVEL_OUTOF10: 5
PAINLEVEL_OUTOF10: 8

## 2024-02-07 ASSESSMENT — PAIN DESCRIPTION - LOCATION
LOCATION: BREAST

## 2024-02-07 ASSESSMENT — PAIN - FUNCTIONAL ASSESSMENT
PAIN_FUNCTIONAL_ASSESSMENT: PREVENTS OR INTERFERES SOME ACTIVE ACTIVITIES AND ADLS
PAIN_FUNCTIONAL_ASSESSMENT: 0-10

## 2024-02-07 NOTE — DISCHARGE INSTRUCTIONS
DISCHARGE SUMMARY from your Nurse    The following personal items collected during your admission are returned to you:   Dental Appliance:    Vision:    Hearing Aid:    Jewelry:    Clothing:    Other Valuables:    Valuables sent to safe: Dose (mL/hr) Propofol : 0 mL/hr      PATIENT INSTRUCTIONS:    After general anesthesia or intravenous sedation, for 24 hours or while taking prescription Narcotics:  Limit your activities  Do not drive and operate hazardous machinery  Do not make important personal or business decisions  Do  not drink alcoholic beverages  If there is redness at the IV site you should apply a warm compress to the area.  If redness or soreness persist call your primary care physician.      These are general instructions for a healthy lifestyle:    *  Please give a list of your current medications to your Primary Care Provider.  *  Please update this list whenever your medications are discontinued, doses are      changed, or new medications (including over-the-counter products) are added.  *  Please carry medication information at all times in case of emergency situations.    No smoking / No tobacco products / Avoid exposure to second hand smoke    Surgeon General's Warning:  Quitting smoking now greatly reduces serious risk to your health.    Obesity, smoking, and sedentary lifestyle greatly increases your risk for illness and disease.  A healthy diet, regular physical exercise & weight monitoring are important for maintaining a healthy lifestyle.      Congestive Heart Failure  You may be retaining fluid if you have a history of heart failure or if you experience any of the following symptoms:  Weight gain of 3 pounds or more overnight or 5 pounds in a week, increased swelling in our hands or feet or shortness of breath while lying flat in bed.  Please call your doctor as soon as you notice any of these symptoms; do not wait until your next office visit.    Recognize signs and symptoms of STROKE:  F -  phone.  Dr Nikki Contreras.  [phone]

## 2024-02-07 NOTE — ANESTHESIA PRE PROCEDURE
Date of last solid food consumption: 02/06/24    BMI:   Wt Readings from Last 3 Encounters:   02/07/24 79.8 kg (175 lb 14.8 oz)   02/05/24 80.7 kg (178 lb)   01/23/24 79.4 kg (175 lb)     Body mass index is 27.55 kg/m².    CBC: No results found for: \"WBC\", \"RBC\", \"HGB\", \"HCT\", \"MCV\", \"RDW\", \"PLT\"    CMP:   Lab Results   Component Value Date/Time     02/05/2024 10:50 AM    K 4.8 02/05/2024 10:50 AM     02/05/2024 10:50 AM    CO2 29 02/05/2024 10:50 AM    BUN 14 02/05/2024 10:50 AM    CREATININE 0.85 02/05/2024 10:50 AM    LABGLOM >60 02/05/2024 10:50 AM    GLUCOSE 89 02/05/2024 10:50 AM    CALCIUM 9.2 02/05/2024 10:50 AM       POC Tests: No results for input(s): \"POCGLU\", \"POCNA\", \"POCK\", \"POCCL\", \"POCBUN\", \"POCHEMO\", \"POCHCT\" in the last 72 hours.    Coags: No results found for: \"PROTIME\", \"INR\", \"APTT\"    HCG (If Applicable): No results found for: \"PREGTESTUR\", \"PREGSERUM\", \"HCG\", \"HCGQUANT\"     ABGs: No results found for: \"PHART\", \"PO2ART\", \"EVI6LTM\", \"DTV3IBW\", \"BEART\", \"B5ABFVMZ\"     Type & Screen (If Applicable):  No results found for: \"LABABO\", \"LABRH\"    Drug/Infectious Status (If Applicable):  No results found for: \"HIV\", \"HEPCAB\"    COVID-19 Screening (If Applicable): No results found for: \"COVID19\"        Anesthesia Evaluation  Patient summary reviewed and Nursing notes reviewed   history of anesthetic complications: PONV.  Airway: Mallampati: II  TM distance: >3 FB   Neck ROM: full  Mouth opening: > = 3 FB   Dental:      Comment: Endorses 1 dental implant R lower    Pulmonary:normal exam  breath sounds clear to auscultation                            ROS comment: Wheezing with URIs in winter. Denies any URI symptoms currently   Cardiovascular:    (+) hypertension:        Rhythm: regular  Rate: normal                    Neuro/Psych:   (+) psychiatric history: stable with treatment            GI/Hepatic/Renal:   (+) GERD: well controlled          Endo/Other:    (+) :

## 2024-02-07 NOTE — OP NOTE
Operative Note  [facility]  [address] [facility], [address]       Patient: Lizabeth Billings  YOB: 1955  MRN: [id number]    Date of Procedure: 2/7/2024    Pre-Op Diagnosis Codes:     * Breast cancer, left (HCC) [C50.912], UIQ, ER+    Post-Op Diagnosis: Same       Procedure(s):  LEFT BREAST LUMPECTOMY AND LEFT BREAST SENTINEL NODE BIOSY WITH BLUE DYE    Surgeon(s):  Nikki Contreras MD    Assistant:   Surgical Assistant: Sheridan Horowitz    Anesthesia: Monitor Anesthesia Care    Estimated Blood Loss (mL): Minimal    Complications: None    Specimens:   ID Type Source Tests Collected by Time Destination   1 : Left breast lumpectomy, suture markins: two short superior, two long lateral Tissue Breast SURGICAL PATHOLOGY Nikki Contreras MD 2/7/2024 0944    2 : left axillary sentinel nodes Tissue Breast SURGICAL PATHOLOGY Nikki Contreras MD 2/7/2024 0951    3 : left breast posterior margin- suture markings: two long lateral, two short superior Tissue Breast SURGICAL PATHOLOGY Nikki Contreras MD 2/7/2024 1007        Implants:  * No implants in log *      Drains: * No LDAs found *    Findings: 2 sentinel nodes    Martin Node Biopsy for Breast Cancer - Left  Operation performed with curative intent. Yes   Tracer(s) used to identify sentinel nodes in the upfront surgery (non-neoadjuvant) setting (select all that apply). Superparamagnetic iron oxide   Tracer(s) used to identify sentinel nodes in the neoadjuvant setting (select all that apply). N/A   All nodes (colored or non-colored) present at the end of a dye-filled lymphatic channel were removed. Yes   All significantly radioactive nodes were removed. N/A   All palpably suspicious nodes were removed. Yes   Biopsy-proven positive nodes marked with clips prior to chemotherapy were identified and removed. N/A            Detailed Description of Procedure:     Pt was brought into the operating room and placed on the table

## 2024-02-07 NOTE — ANESTHESIA POSTPROCEDURE EVALUATION
Department of Anesthesiology  Postprocedure Note    Patient: Lizabeth Billings  MRN: [id number]  YOB: 1955  Date of evaluation: 2/7/2024    Procedure Summary       Date: 02/07/24 Room / Location: [facility] ASU OR [address] AMBULATORY OR    Anesthesia Start: 0910 Anesthesia Stop: 1050    Procedure: LEFT BREAST LUMPECTOMY AND LEFT BREAST SENTINEL NODE BIOSY WITH BLUE DYE (Left: Breast) Diagnosis:       Breast cancer, left (HCC)      (Breast cancer, left (HCC) [C50.912])    Surgeons: Nikki Contreras MD Responsible Provider: Catracho Mathews MD    Anesthesia Type: TIVA, MAC ASA Status: 2            Anesthesia Type: No value filed.    Daja Phase I: Daja Score: 9    Daja Phase II:      Anesthesia Post Evaluation    No notable events documented.

## 2024-02-07 NOTE — INTERVAL H&P NOTE
Update History & Physical    The patient's History and Physical of January 23, 2024 was reviewed with the patient and I examined the patient. There was no change. The surgical site was confirmed by the patient and me.     LEFT UIQ 1.5cm invasive ductal carcinoma, grade 2, %, %, Her2 neg, Ki 8%     MRI Result (most recent):  MRI BREAST BILATERAL W WO CONTRAST 02/01/2024    Narrative  EXAM:  MRI BREAST BILATERAL W WO CONTRAST    INDICATION: New diagnosis of left breast carcinoma, evaluate extent of disease.    COMPARISON: Breast imaging on 1/19/2024. No comparison MRI.    EXAM: MRI of right and left breast without and with IV contrast Gadolinium .    TECHNIQUE: MRI breast without and with IV contrast. Bilateral breast MRI was  performed using a dedicated breast coil without compression with the patient in  the prone position. Precontrast T1-weighted images with fat suppression were  obtained followed by bolus injection of 8 mL of Gadavist . Postcontrast dynamic  and high-resolution images were acquired. Axial inversion recovery imaging was  also performed. The images were analyzed using CAD analysis, enhancement curves,  digital subtraction, and 2 and 3 dimensional reconstructions.    FINDINGS:    Background parenchymal enhancement: Moderate.    Lymph nodes: No lymphadenopathy.    Right breast: There is no suspicious focus of morphology or temporal  enhancement. There are numerous foci. Normal skin and nipple.    Left breast: In the 9:00 position of the anterior third, lobulated arterially  enhancing mass with irregular margins and washout kinetics measures 1.9 x 1.8 x  1.4 cm. Estimated volume is 2 mL. Biopsy clip is within the mass. No extension  to scan or nipple. There are foci in the remainder of the left breast. No other  suspicious morphology or enhancement. Normal skin and nipple.    Miscellaneous: None.    Impression  1. 1.9 cm biopsy-proven carcinoma is in the 9:00 position of the left breast

## 2024-02-08 ENCOUNTER — TELEPHONE (OUTPATIENT)
Age: 69
End: 2024-02-08

## 2024-02-08 DIAGNOSIS — Z17.0 MALIGNANT NEOPLASM OF UPPER-INNER QUADRANT OF LEFT BREAST IN FEMALE, ESTROGEN RECEPTOR POSITIVE (HCC): Primary | ICD-10-CM

## 2024-02-08 DIAGNOSIS — C50.212 MALIGNANT NEOPLASM OF UPPER-INNER QUADRANT OF LEFT BREAST IN FEMALE, ESTROGEN RECEPTOR POSITIVE (HCC): Primary | ICD-10-CM

## 2024-02-12 ENCOUNTER — CLINICAL DOCUMENTATION (OUTPATIENT)
Age: 69
End: 2024-02-12

## 2024-02-12 NOTE — PROGRESS NOTES
The patient called stating she has \"a lot\" of pain this weekend, primarily in the LEFT axillary area. The patient is S/P LEFT lumpectomy and SLNB 2/7/2024. I offered the patient an appointment tomorrow to see Dr. Contreras post operatively but the patient is up in [facility] recuperating with her sister. She said the discomfort has greatly decreased today and she feels better. She has agreed to come in on Thursday when she is back in the area unless she feels much improved and doesn't need to be seen. The patient will let us know.

## 2024-02-13 ENCOUNTER — TELEPHONE (OUTPATIENT)
Age: 69
End: 2024-02-13

## 2024-02-13 NOTE — TELEPHONE ENCOUNTER
Called patient.  POD#6  She is doing well aside from pain in her axilla  Follow up appt on Thursday 2/2024 LEFT lumpectomy UIQ 2cm invasive ductal carcinoma, grade 2, 0/2, %, %, Her2 neg, Ki 8%      Rad onc appt in 2 weeks  Oncotype pending

## 2024-02-15 ENCOUNTER — TELEPHONE (OUTPATIENT)
Age: 69
End: 2024-02-15

## 2024-02-15 ENCOUNTER — OFFICE VISIT (OUTPATIENT)
Age: 69
End: 2024-02-15

## 2024-02-15 VITALS — HEIGHT: 67 IN | BODY MASS INDEX: 27.47 KG/M2 | WEIGHT: 175 LBS

## 2024-02-15 DIAGNOSIS — N64.89 SEROMA OF BREAST: ICD-10-CM

## 2024-02-15 DIAGNOSIS — Z09 SURGERY FOLLOW-UP: Primary | ICD-10-CM

## 2024-02-15 PROCEDURE — 99024 POSTOP FOLLOW-UP VISIT: CPT | Performed by: SURGERY

## 2024-02-15 NOTE — TELEPHONE ENCOUNTER
Your medical oncology appointment with Dr Macario Chase is 02/26/2024 @ 4:15 PM    The address is: [address] [facility] [facility] [address] [address]  Telephone # is [phone]    Please arrive 15 minutes early.  Bring your ID, Insurance card, and co-pay if needed.    Please call their office if you need to cancel and reschedule.            Your radiation oncologist appointment with Dr Griffith  02/22/2024 @ 2:00 PM    The address is [address] [facility],[address], [place] [address]    [phone]        Please arrive 15 minutes early    Please bring your ID, insurance card and co-pay if needed.    Please call their office if you need to cancel and reschedule.

## 2024-02-15 NOTE — PROGRESS NOTES
HISTORY OF PRESENT ILLNESS  Lizabeth Billings is a 68 y.o. female     HPI ESTABLISHED patient POD#8  S/P LEFT lumpectomy presents with axillary pain.    2/2024 LEFT lumpectomy UIQ 2cm invasive ductal carcinoma, grade 2, 0/2, %, %, Her2 neg, Ki 8%      Review of Systems      Physical Exam  Chest:   Breasts:     Left: Swelling, skin change (bright erythema in the axilla.  incisions healing well) and tenderness present.              ASPIRATION OF SEROMA  Indication : Seroma:  left  upper inner quadrant and axilla  Prep : Alcohol.   Guidance : Ultrasound guidance.    Yield :  LEFT axilla 40cc and LEFT breast UIQ 50cc of serous fluid was aspirated with an 18 gauge needle.  Effect : Seromas aspirated.  Tolerance: Pt tolerated the procedure with minimal discomfort  Disposition:  Follow up in one week if swelling recurs     ASSESSMENT and PLAN   Diagnosis Orders   1. Surgery follow-up        2. Seroma of breast          Breast and axillary seromas aspirated  The axillary skin is red.  Pt said it wasn't this red until now.  No fever.  No exudate.  Follow up if swelling recurs.  Consider abx if erythema does not improve.

## 2024-02-22 ENCOUNTER — TELEPHONE (OUTPATIENT)
Facility: HOSPITAL | Age: 69
End: 2024-02-22

## 2024-02-22 ENCOUNTER — OFFICE VISIT (OUTPATIENT)
Age: 69
End: 2024-02-22

## 2024-02-22 ENCOUNTER — HOSPITAL ENCOUNTER (OUTPATIENT)
Facility: HOSPITAL | Age: 69
Discharge: HOME | End: 2024-02-25

## 2024-02-22 VITALS — BODY MASS INDEX: 27.47 KG/M2 | WEIGHT: 175 LBS | HEIGHT: 67 IN

## 2024-02-22 VITALS
DIASTOLIC BLOOD PRESSURE: 64 MMHG | HEIGHT: 67 IN | RESPIRATION RATE: 16 BRPM | BODY MASS INDEX: 27.62 KG/M2 | SYSTOLIC BLOOD PRESSURE: 105 MMHG | HEART RATE: 70 BPM | WEIGHT: 176 LBS

## 2024-02-22 DIAGNOSIS — Z09 SURGERY FOLLOW-UP: Primary | ICD-10-CM

## 2024-02-22 DIAGNOSIS — Z17.0 MALIGNANT NEOPLASM OF UPPER-INNER QUADRANT OF LEFT BREAST IN FEMALE, ESTROGEN RECEPTOR POSITIVE (HCC): Primary | ICD-10-CM

## 2024-02-22 DIAGNOSIS — C50.212 MALIGNANT NEOPLASM OF UPPER-INNER QUADRANT OF LEFT BREAST IN FEMALE, ESTROGEN RECEPTOR POSITIVE (HCC): Primary | ICD-10-CM

## 2024-02-22 PROCEDURE — 99024 POSTOP FOLLOW-UP VISIT: CPT | Performed by: SURGERY

## 2024-02-22 RX ORDER — CEPHALEXIN 250 MG/1
250 CAPSULE ORAL 4 TIMES DAILY
Qty: 40 CAPSULE | Refills: 0 | Status: SHIPPED | OUTPATIENT
Start: 2024-02-22

## 2024-02-22 ASSESSMENT — PAIN SCALES - GENERAL: PAINLEVEL_OUTOF10: 3

## 2024-02-22 ASSESSMENT — PAIN DESCRIPTION - PAIN TYPE: TYPE: SURGICAL PAIN

## 2024-02-22 ASSESSMENT — PAIN DESCRIPTION - LOCATION: LOCATION: BREAST

## 2024-02-22 ASSESSMENT — PAIN DESCRIPTION - ORIENTATION: ORIENTATION: LEFT

## 2024-02-22 ASSESSMENT — PAIN DESCRIPTION - DESCRIPTORS: DESCRIPTORS: TENDER;ACHING

## 2024-02-22 NOTE — PROGRESS NOTES
[facility] [place] at [facility]  [facility]    Radiation Oncology Consultation  Encounter Date: 02/22/24    Lizabeth Billings  [id number]  1955     Diagnosis   1. C50.212, Z17.0: [id number] +/+/- grade II IDC left breast    AJCC Staging has been reviewed.  History of Present Illness   Ms. Billings is a 68 y.o. female seen in consultation at the request of Dr. Contreras to assess the overall management and role of radiation for her above diagnosis.    her oncologic history is detailed below:  11/22/2023: Screening mammogram showed a left breast abnormality  01/19/2024: Diagnostic mammogram showed a 1.5cm suspicious mass in the middle third left breast at 9:00 5CFN.  01/23/2024: Biopsy showed %, %, her2/agatha 2+ ki67 8% grade II invasive ductal carcinoma (12mm)  02/01/2024: MRI breast re-demonstrated the mass measuring 1.9cm, no multifocal disease, right breast abnormality or regional lymphadenopathy bilaterally.  02/07/2024: She underwent left breast lumpectomy and SLNBx with Dr. Contreras, pathology showed a 2.0cm grade II IDC with negative margins (closest >3mm), no associated DCIS, LVSI. 0/2 positive SLN, staged pT1cN0.    Symptomatically, she recovering from her surgery, had developed a seroma at the lumpectomy cavity and axilla s/p aspirations. Recently developed erythema and discomfort to the area and was prescribed antibiotics. She is pending oncotype and will meet with Dr. Chase soon as well. From a performance status standpoint, she is able to complete her ADLs without issue. Ms. Billings denies a history of inflammatory bowel disease, scleroderma or other collagen vascular diseases, pacemaker/AICD, or history of prior irradiation.    Review of Systems:  A complete review of systems was obtained and negative except as described above.    Allergies and Medications     Allergies   Allergen Reactions    Meperidine Other (See Comments)     Arm swelling/severe       Current

## 2024-02-22 NOTE — PROGRESS NOTES
HISTORY OF PRESENT ILLNESS  Lizabeth Billings is a 68 y.o. female     HPI ESTABLISHED patient here today for post op follow up LEFT breast lumpectomy and SLNB POD #15. The patient has complaints of LEFT breast and axilla swelling. The axillary area is red and painful to touch.    2/2024 LEFT lumpectomy UIQ 2cm invasive ductal carcinoma, grade 2, 0/2, %, %, Her2 neg, Ki 8%      2/15/24 -LEFT axilla 40cc and LEFT breast UIQ 50cc of serous fluid was aspirated       Review of Systems      Physical Exam  Chest:   Breasts:     Left: Skin change (axilary incision is hypertropic, with surrounding erythema) and tenderness present.                ASSESSMENT and PLAN   Diagnosis Orders   1. Surgery follow-up          No seroma  Erythema at the axillary incision, and the scar is hypertrophic  Pt will start Keflex.  Anticipate RTW in one week

## 2024-02-22 NOTE — TELEPHONE ENCOUNTER
Called [employer] to check status of Oncotype result. Per rep, sample was received on 2/16 and testing is approximately mid-way. Estimated result date is 3/1. Answered questions regarding whether or not specimen was received as inpatient or outpatient. Also, per rep, the billing case has been closed meaning there will be no delays or holds in releasing the report once testing is complete.     Spoke with Dr. Chase regarding above. He recommends rescheduling consultation to allow time for oncotype to result. This will allow for a more thorough discussion about next steps in treatment.     Called pt to discuss rescheduling appointment with Dr. Chase. No answer at time of call. Left VM requesting callback.     Addendum:  Pt returned call. She was aware of the Oncotype being ordered. Discussed importance of result for her Medical Oncology consult and therefore needing to reschedule. Appointment rescheduled to Monday, 3/4, at 4:15pm. Pt confirmed. Assured her we would keep an eye out for the report and let her know if it comes back sooner. No additional questions/concerns for NN at this time.       ESSENCE Hoffmann, RN, CMSRN  Breast Cancer Nurse Navigator    [facility] [place]  [address] [address] [facility] [address]   [address]  [place] [address]  W: [phone]  F: [phone]  [email or web address]   Good Help to Those in Need®

## 2024-02-27 ENCOUNTER — CLINICAL DOCUMENTATION (OUTPATIENT)
Facility: HOSPITAL | Age: 69
End: 2024-02-27

## 2024-02-28 NOTE — PROGRESS NOTES
NCCN Distress Thermometer    Date Screening Completed: 2/22/24    Screening Declined:  [] Yes    Number that best describes how much distress you've experienced in the past week, including today?  0 [] - No distress 1 []      2 []      3 []      4 []       5 [x]       6 []      7 []      8 []      9 []       10 [] - Extreme distress    PROBLEM LIST  Have you had concerns about any of the items below in the past week, including today?      Physical Concerns Practical Concerns   [x] Pain [x] Taking care of myself    [x] Sleep [] Taking care of others    [x] Fatigue [x] Work   [] Tobacco use  [] School   [] Substance use  [] Housing   [x] Memory or concentration [] Finances   [] Sexual health [] Insurance   [] Changes in eating  [] Transportation   [] Loss or change of physical abilities  []     [] Having enough food   Emotional Concerns [] Access to medicine   [] Worry or anxiety [x] Treatment decisions   [x] Sadness or depression    [] Loss of interest or enjoyment  Spiritual or Pentecostalism Concerns   [] Grief or loss  [] Sense of meaning or purpose   [] Fear [] Changes in paloma or beliefs   [] Loneliness  [] Death, dying, or afterlife   [] Anger [] Conflict between beliefs and cancer treatments    [] Changes in appearance [] Relationship with the sacred   [x] Feelings of worthlessness or being a burden [] Ritual or dietary needs        Social Concerns     [] Relationship with spouse or partner     [] Relationship with children    [] Relationship with family members     [] Relationship with friends or coworkers     [] Communication with health care team     [] Ability to have children     [] Prejudice or discrimination        Other Concerns: none    Patient received resource information and education:  [x] Yes  [] No    Referral/Handouts: Support Groups referral

## 2024-02-28 NOTE — PROGRESS NOTES
[facility] [place]  Oncology Social Work  Encounter     Patient Name:  Lizabeth Billings    Narrative: spoke with patient to introduce  role and support and to discuss distress screening. Patient with a score of 5. Patient shared she is not sure about going forward with rtx. She noted she is having a hard time right personally. Offered to discuss but patient stated not a good time. Provided her with my contact information and encouraged her to reach out if interested. Provided information on support groups.  Patient interested. Email added to interest list.     Barriers to Care: n/a    Plan: Follow up as needed, email support group information    Referral/Handouts:   Support Groups referral

## 2024-03-04 ENCOUNTER — INITIAL CONSULT (OUTPATIENT)
Age: 69
End: 2024-03-04
Payer: MEDICARE

## 2024-03-04 ENCOUNTER — CLINICAL DOCUMENTATION (OUTPATIENT)
Facility: HOSPITAL | Age: 69
End: 2024-03-04

## 2024-03-04 VITALS
DIASTOLIC BLOOD PRESSURE: 65 MMHG | HEIGHT: 66 IN | OXYGEN SATURATION: 98 % | HEART RATE: 85 BPM | BODY MASS INDEX: 28.12 KG/M2 | SYSTOLIC BLOOD PRESSURE: 102 MMHG | TEMPERATURE: 98 F | WEIGHT: 175 LBS | RESPIRATION RATE: 16 BRPM

## 2024-03-04 DIAGNOSIS — Z17.0 MALIGNANT NEOPLASM OF UPPER-INNER QUADRANT OF LEFT BREAST IN FEMALE, ESTROGEN RECEPTOR POSITIVE (HCC): Primary | ICD-10-CM

## 2024-03-04 DIAGNOSIS — F33.9 RECURRENT DEPRESSION (HCC): ICD-10-CM

## 2024-03-04 DIAGNOSIS — C50.212 MALIGNANT NEOPLASM OF UPPER-INNER QUADRANT OF LEFT BREAST IN FEMALE, ESTROGEN RECEPTOR POSITIVE (HCC): Primary | ICD-10-CM

## 2024-03-04 DIAGNOSIS — Z78.0 POSTMENOPAUSAL: ICD-10-CM

## 2024-03-04 PROCEDURE — 99204 OFFICE O/P NEW MOD 45 MIN: CPT | Performed by: INTERNAL MEDICINE

## 2024-03-04 RX ORDER — ANASTROZOLE 1 MG/1
1 TABLET ORAL DAILY
Qty: 90 TABLET | Refills: 3 | Status: SHIPPED | OUTPATIENT
Start: 2024-03-04

## 2024-03-04 RX ORDER — CETIRIZINE HYDROCHLORIDE 10 MG/1
10 TABLET ORAL DAILY
COMMUNITY

## 2024-03-04 NOTE — PROGRESS NOTES
an eye exam is recommended yearly. Other risks include vaginal discharge or dryness, the development or worsening of hot flashes or vasomotor symptoms, and bone loss in premenopausal women. There is excellent evidence that tamoxifen does not increase risk of depression, cause weight gain or have a major effect on sexual function. Available data suggests little or no effect on cognitive function. Benefits include a lowering of cholesterol and a reduction in the rate of bone loss for postmenopausal woman. Any other symptoms should be reported.    The risks and benefits of aromatase inhibitors (anastrozole, letrozole, and exemestane) were discussed in detail and the patient was informed of the following: Risks include the development of painful muscles and joints (arthralgia/myalgia) and bone loss. Muscle and joint pain can be severe but rarely result in any tissue damage; symptoms usually resolve in several weeks when the medication is stopped. Bone loss is common and a bone density test is recommended as a baseline and then yearly to every several years depending on initial results. The risk of fractures is increased by a few percent in patients taking these drugs, but careful monitoring of bone density and using bone protecting agents when indicated can minimize these risks. Unlike tamoxifen there is no increased risk of blood clots or endometrial cancer. AIs can cause or worsen vaginal dryness but women using these drugs should not use vaginal estrogen preparations for these symptoms. AIs can also cause or increase hot flashes. Any other symptoms should be reported. The patient has consented to receiving therapy.    After this discussion, she is agreeable to anastrozole 1 mg daily, rx in, she may start.  Dexa ordered    Follow-up after early breast cancer was discussed. I recommend follow-up as defined by the American Society of Clinical Oncology and National Comprehensive Cancer Network. This includes a visit to a

## 2024-03-05 ENCOUNTER — OFFICE VISIT (OUTPATIENT)
Age: 69
End: 2024-03-05

## 2024-03-05 ENCOUNTER — TELEPHONE (OUTPATIENT)
Age: 69
End: 2024-03-05

## 2024-03-05 VITALS — BODY MASS INDEX: 28.12 KG/M2 | WEIGHT: 175 LBS | HEIGHT: 66 IN

## 2024-03-05 DIAGNOSIS — Z85.3 HX: BREAST CANCER: ICD-10-CM

## 2024-03-05 DIAGNOSIS — L91.0 HYPERTROPHIC SCAR: ICD-10-CM

## 2024-03-05 DIAGNOSIS — Z85.3 HX OF BREAST CANCER: Primary | ICD-10-CM

## 2024-03-05 DIAGNOSIS — L91.0 HYPERTROPHIC SCAR: Primary | ICD-10-CM

## 2024-03-05 PROCEDURE — 99024 POSTOP FOLLOW-UP VISIT: CPT | Performed by: SURGERY

## 2024-03-05 NOTE — PROGRESS NOTES
HISTORY OF PRESENT ILLNESS  Lizabeth Billings is a 68 y.o. female     HPI ESTABLISHED Patient 1 month s/p LEFT lumpectomy, presents with LEFT axillary pain. completed keflex.     2/22/24- no seroma, started Keflex   2/15/24 -LEFT axilla 40cc and LEFT breast UIQ 50cc of serous fluid was aspirated     2/2024 LEFT lumpectomy UIQ 2cm invasive ductal carcinoma, grade 2, 0/2, %, %, Her2 neg, Ki 8%    Anastrozole.  No XRT  Oncotype 6       Review of Systems      Physical Exam  Cardiovascular:      Rate and Rhythm: Normal rate and regular rhythm.   Pulmonary:      Effort: Pulmonary effort is normal.      Breath sounds: Normal breath sounds.   Chest:   Breasts:     Right: No swelling, mass, skin change or tenderness.      Left: Skin change (hypertrophic scar axilla) present. No swelling, mass or tenderness.       Lymphadenopathy:      Upper Body:      Right upper body: No axillary adenopathy.      Left upper body: No axillary adenopathy.   Neurological:      Mental Status: She is alert.            ASSESSMENT and PLAN   Diagnosis Orders   1. Hx of breast cancer        2. Hypertrophic scar          Hypertrophic scar LEFT axilla  Will schedule excision of the hypertrophic LEFT axillary scar.

## 2024-03-05 NOTE — TELEPHONE ENCOUNTER
Attempted to call patient to schedule follow up. No answer; left vm     Return in about 4 months (around 6/25/2024).

## 2024-03-05 NOTE — PROGRESS NOTES
[facility]  Breast Cancer Nurse Navigator Encounter    Name: Lizabeth Billings  Age: 68 y.o.  : 1955  Diagnosis: Left breast IDC; ER+/FL+/HER2-    Encounter type:  []Initial Navigator Encounter  []Patient Initiated  [x]Navigator Follow-up  []Other:     Narrative:   Met pt while in clinic for her med/onc consult. Reintroduced self/role of NN. Pt shares some post-op concerns to the left breast and axilla. She has an appointment with Dr. Contreras tomorrow to evaluate and potentially drain seroma(s). She completes course of antibiotics this evening. No specific questions/concerns for NN at this time. Provided my contact info and encouraged her to reach out as needed.     Interdisciplinary Team:  Surg-Onc: Nikki Contreras MD  Med-Onc: Macario Chase MD  Rad-Onc:  Plastics:  :   Nurse Navigator: JUVE Hoffmann BSN, RN, CMSRN  Breast Cancer Nurse Navigator    [facility]  [address]   [address]  [place] [address]  W: [phone]  F: [phone]  [email or web address]   Good Help to Those in Need®

## 2024-03-05 NOTE — H&P (VIEW-ONLY)
HISTORY OF PRESENT ILLNESS  Lizabeth Bililngs is a 68 y.o. female     HPI ESTABLISHED Patient 1 month s/p LEFT lumpectomy, presents with LEFT axillary pain. completed keflex.     2/22/24- no seroma, started Keflex   2/15/24 -LEFT axilla 40cc and LEFT breast UIQ 50cc of serous fluid was aspirated     2/2024 LEFT lumpectomy UIQ 2cm invasive ductal carcinoma, grade 2, 0/2, %, %, Her2 neg, Ki 8%    Anastrozole.  No XRT  Oncotype 6       Review of Systems      Physical Exam  Cardiovascular:      Rate and Rhythm: Normal rate and regular rhythm.   Pulmonary:      Effort: Pulmonary effort is normal.      Breath sounds: Normal breath sounds.   Chest:   Breasts:     Right: No swelling, mass, skin change or tenderness.      Left: Skin change (hypertrophic scar axilla) present. No swelling, mass or tenderness.       Lymphadenopathy:      Upper Body:      Right upper body: No axillary adenopathy.      Left upper body: No axillary adenopathy.   Neurological:      Mental Status: She is alert.            ASSESSMENT and PLAN   Diagnosis Orders   1. Hx of breast cancer        2. Hypertrophic scar          Hypertrophic scar LEFT axilla  Will schedule excision of the hypertrophic LEFT axillary scar.

## 2024-03-06 ENCOUNTER — TELEPHONE (OUTPATIENT)
Age: 69
End: 2024-03-06

## 2024-03-06 PROBLEM — L91.0 SCARRING, HYPERTROPHIC: Status: ACTIVE | Noted: 2024-02-01

## 2024-03-06 NOTE — TELEPHONE ENCOUNTER
Patient Surgery Information Sheet      Patient Name:  Lizabeth Billings  Surgery Date:  March 13, 2024    Type of Surgery:  SCAR REVISION LEFT AXILLARY INCISION    Estimated arrival time 8:20AM    Arrival time will be confirmed the afternoon before your surgery.    Pre-procedure: Not Applicable    Pre-Operative Testing Department will call to schedule pre-op testing appointment if needed before surgery    Hospital:  [facility]  Address:  [address] [place] [address]. [facility] [address]  Check in location:  Through the Main Entrance of [place], Take the elevator on the left side to the second floor on your left as you step out of the elevator    Correction: take a RIGHT off the elevator once  you are on the 2nd floor.    Pre-Operative Instructions:  Will be given at the pre-op appointment.    Special Instructions if needed:     NPO (nothing by mouth) or drinking after midnight the night before Surgery  Patient may shower the morning of, do not use an lotion, deodorant, powders, perfumes or makeup  Patient will need  the morning of surgery     Surgery Scheduler:  Norma Esqueda

## 2024-03-07 ENCOUNTER — TELEPHONE (OUTPATIENT)
Age: 69
End: 2024-03-07

## 2024-03-07 ENCOUNTER — HOSPITAL ENCOUNTER (OUTPATIENT)
Facility: HOSPITAL | Age: 69
Discharge: HOME | End: 2024-03-07
Payer: MEDICARE

## 2024-03-07 VITALS
RESPIRATION RATE: 14 BRPM | WEIGHT: 175.93 LBS | TEMPERATURE: 97.1 F | BODY MASS INDEX: 27.61 KG/M2 | SYSTOLIC BLOOD PRESSURE: 126 MMHG | DIASTOLIC BLOOD PRESSURE: 79 MMHG | HEIGHT: 67 IN | OXYGEN SATURATION: 98 % | HEART RATE: 84 BPM

## 2024-03-07 DIAGNOSIS — Z17.0 MALIGNANT NEOPLASM OF UPPER-INNER QUADRANT OF LEFT BREAST IN FEMALE, ESTROGEN RECEPTOR POSITIVE (HCC): Primary | ICD-10-CM

## 2024-03-07 DIAGNOSIS — C50.212 MALIGNANT NEOPLASM OF UPPER-INNER QUADRANT OF LEFT BREAST IN FEMALE, ESTROGEN RECEPTOR POSITIVE (HCC): Primary | ICD-10-CM

## 2024-03-07 LAB
ANION GAP SERPL CALC-SCNC: 5 MMOL/L (ref 5–15)
BUN SERPL-MCNC: 14 MG/DL (ref 6–20)
BUN/CREAT SERPL: 15 (ref 12–20)
CALCIUM SERPL-MCNC: 9.3 MG/DL (ref 8.5–10.1)
CHLORIDE SERPL-SCNC: 104 MMOL/L (ref 97–108)
CO2 SERPL-SCNC: 27 MMOL/L (ref 21–32)
CREAT SERPL-MCNC: 0.95 MG/DL (ref 0.55–1.02)
GLUCOSE SERPL-MCNC: 94 MG/DL (ref 65–100)
POTASSIUM SERPL-SCNC: 4.4 MMOL/L (ref 3.5–5.1)
SODIUM SERPL-SCNC: 136 MMOL/L (ref 136–145)

## 2024-03-07 PROCEDURE — 80048 BASIC METABOLIC PNL TOTAL CA: CPT

## 2024-03-07 PROCEDURE — 36415 COLL VENOUS BLD VENIPUNCTURE: CPT

## 2024-03-07 RX ORDER — HYDROCODONE BITARTRATE AND ACETAMINOPHEN 5; 325 MG/1; MG/1
1 TABLET ORAL EVERY 4 HOURS PRN
Qty: 15 TABLET | Refills: 0 | Status: SHIPPED | OUTPATIENT
Start: 2024-03-07 | End: 2024-03-14

## 2024-03-07 NOTE — PERIOP NOTE
[facility] [place]                   [address] [place] [address]   MAIN OR                                  [phone]   MAIN PRE OP                          [phone]                                                                                AMBULATORY PRE OP          [phone]  PRE-ADMISSION TESTING    [phone]     Surgery Date:  March 13 (Wednesday)       Is surgery arrival time given by surgeon?    NO  If “NO”, [place] staff will call you between 3 and 7pm the day before your surgery with your arrival time. (If your surgery is on a Monday, we will call you the Friday before.)    Call [phone] after 7pm Monday-Friday if you did not receive this call.    INSTRUCTIONS BEFORE YOUR SURGERY   When You  Arrive Arrive at the 2nd Floor Admitting Desk on the day of your surgery  Have your insurance card, photo ID, and any copayment (if needed)   Food   and   Drink NO food or drink after midnight the night before surgery    This means NO water, gum, mints, coffee, juice, etc.  No alcohol (beer, wine, liquor) 24 hours before and after surgery   Medications to   TAKE   Morning of Surgery MEDICATIONS TO TAKE THE MORNING OF SURGERY WITH A SIP OF WATER:     Take medications as directed, except:    Losartan - Do not take day of surgery or night before.     Medications  To  STOP      7 days before surgery Non-Steroidal anti-inflammatory Drugs (NSAID's): for example, Ibuprofen (Advil, Motrin), Naproxen (Aleve)  Aspirin, if taking for pain   Herbal supplements, vitamins, and fish oil  Other:  (Pain medications not listed above, including Tylenol may be taken)       Bathing Clothing  Jewelry  Valuables     If you shower the morning of surgery, please do not apply anything to your skin (lotions, powders, deodorant, or makeup, especially mascara)  Follow Chlorhexidine Care Fusion body wash instructions provided to you during PAT appointment. Begin 3 days prior to surgery.  Do

## 2024-03-12 ENCOUNTER — ANESTHESIA EVENT (OUTPATIENT)
Facility: HOSPITAL | Age: 69
End: 2024-03-12
Payer: MEDICARE

## 2024-03-13 ENCOUNTER — ANESTHESIA (OUTPATIENT)
Facility: HOSPITAL | Age: 69
End: 2024-03-13
Payer: MEDICARE

## 2024-03-13 ENCOUNTER — HOSPITAL ENCOUNTER (OUTPATIENT)
Facility: HOSPITAL | Age: 69
Setting detail: OUTPATIENT SURGERY
Discharge: HOME | End: 2024-03-13
Attending: SURGERY | Admitting: SURGERY
Payer: MEDICARE

## 2024-03-13 VITALS
OXYGEN SATURATION: 95 % | HEART RATE: 67 BPM | DIASTOLIC BLOOD PRESSURE: 74 MMHG | SYSTOLIC BLOOD PRESSURE: 99 MMHG | HEIGHT: 67 IN | BODY MASS INDEX: 27.16 KG/M2 | WEIGHT: 173.06 LBS | RESPIRATION RATE: 12 BRPM | TEMPERATURE: 97.7 F

## 2024-03-13 PROCEDURE — 2720000010 HC SURG SUPPLY STERILE: Performed by: SURGERY

## 2024-03-13 PROCEDURE — 3600000003 HC SURGERY LEVEL 3 BASE: Performed by: SURGERY

## 2024-03-13 PROCEDURE — 2580000003 HC RX 258: Performed by: ANESTHESIOLOGY

## 2024-03-13 PROCEDURE — 3700000000 HC ANESTHESIA ATTENDED CARE: Performed by: SURGERY

## 2024-03-13 PROCEDURE — 7100000011 HC PHASE II RECOVERY - ADDTL 15 MIN: Performed by: SURGERY

## 2024-03-13 PROCEDURE — 3700000001 HC ADD 15 MINUTES (ANESTHESIA): Performed by: SURGERY

## 2024-03-13 PROCEDURE — 2500000003 HC RX 250 WO HCPCS: Performed by: SURGERY

## 2024-03-13 PROCEDURE — 88304 TISSUE EXAM BY PATHOLOGIST: CPT

## 2024-03-13 PROCEDURE — 6360000002 HC RX W HCPCS: Performed by: ANESTHESIOLOGY

## 2024-03-13 PROCEDURE — 2709999900 HC NON-CHARGEABLE SUPPLY: Performed by: SURGERY

## 2024-03-13 PROCEDURE — 3600000013 HC SURGERY LEVEL 3 ADDTL 15MIN: Performed by: SURGERY

## 2024-03-13 PROCEDURE — 7100000010 HC PHASE II RECOVERY - FIRST 15 MIN: Performed by: SURGERY

## 2024-03-13 RX ORDER — MIDAZOLAM HYDROCHLORIDE 1 MG/ML
INJECTION INTRAMUSCULAR; INTRAVENOUS PRN
Status: DISCONTINUED | OUTPATIENT
Start: 2024-03-13 | End: 2024-03-13 | Stop reason: SDUPTHER

## 2024-03-13 RX ORDER — SODIUM CHLORIDE, SODIUM LACTATE, POTASSIUM CHLORIDE, CALCIUM CHLORIDE 600; 310; 30; 20 MG/100ML; MG/100ML; MG/100ML; MG/100ML
INJECTION, SOLUTION INTRAVENOUS CONTINUOUS PRN
Status: DISCONTINUED | OUTPATIENT
Start: 2024-03-13 | End: 2024-03-13 | Stop reason: SDUPTHER

## 2024-03-13 RX ORDER — MIDAZOLAM HYDROCHLORIDE 2 MG/2ML
2 INJECTION, SOLUTION INTRAMUSCULAR; INTRAVENOUS
Status: DISCONTINUED | OUTPATIENT
Start: 2024-03-13 | End: 2024-03-13 | Stop reason: HOSPADM

## 2024-03-13 RX ORDER — LIDOCAINE HYDROCHLORIDE 10 MG/ML
1 INJECTION, SOLUTION EPIDURAL; INFILTRATION; INTRACAUDAL; PERINEURAL
Status: DISCONTINUED | OUTPATIENT
Start: 2024-03-13 | End: 2024-03-13 | Stop reason: HOSPADM

## 2024-03-13 RX ORDER — ONDANSETRON 2 MG/ML
4 INJECTION INTRAMUSCULAR; INTRAVENOUS
Status: DISCONTINUED | OUTPATIENT
Start: 2024-03-13 | End: 2024-03-13 | Stop reason: HOSPADM

## 2024-03-13 RX ORDER — FENTANYL CITRATE 50 UG/ML
100 INJECTION, SOLUTION INTRAMUSCULAR; INTRAVENOUS
Status: DISCONTINUED | OUTPATIENT
Start: 2024-03-13 | End: 2024-03-13 | Stop reason: HOSPADM

## 2024-03-13 RX ORDER — NALOXONE HYDROCHLORIDE 0.4 MG/ML
INJECTION, SOLUTION INTRAMUSCULAR; INTRAVENOUS; SUBCUTANEOUS PRN
Status: DISCONTINUED | OUTPATIENT
Start: 2024-03-13 | End: 2024-03-13 | Stop reason: HOSPADM

## 2024-03-13 RX ORDER — SODIUM CHLORIDE, SODIUM LACTATE, POTASSIUM CHLORIDE, CALCIUM CHLORIDE 600; 310; 30; 20 MG/100ML; MG/100ML; MG/100ML; MG/100ML
INJECTION, SOLUTION INTRAVENOUS CONTINUOUS
Status: DISCONTINUED | OUTPATIENT
Start: 2024-03-13 | End: 2024-03-13 | Stop reason: HOSPADM

## 2024-03-13 RX ORDER — PROPOFOL 10 MG/ML
INJECTION, EMULSION INTRAVENOUS PRN
Status: DISCONTINUED | OUTPATIENT
Start: 2024-03-13 | End: 2024-03-13 | Stop reason: SDUPTHER

## 2024-03-13 RX ORDER — FENTANYL CITRATE 50 UG/ML
INJECTION, SOLUTION INTRAMUSCULAR; INTRAVENOUS PRN
Status: DISCONTINUED | OUTPATIENT
Start: 2024-03-13 | End: 2024-03-13 | Stop reason: SDUPTHER

## 2024-03-13 RX ORDER — DIPHENHYDRAMINE HYDROCHLORIDE 50 MG/ML
12.5 INJECTION INTRAMUSCULAR; INTRAVENOUS
Status: DISCONTINUED | OUTPATIENT
Start: 2024-03-13 | End: 2024-03-13 | Stop reason: HOSPADM

## 2024-03-13 RX ORDER — BUPIVACAINE HYDROCHLORIDE AND EPINEPHRINE 5; 5 MG/ML; UG/ML
INJECTION, SOLUTION PERINEURAL PRN
Status: DISCONTINUED | OUTPATIENT
Start: 2024-03-13 | End: 2024-03-13 | Stop reason: HOSPADM

## 2024-03-13 RX ADMIN — PROPOFOL 60 MG: 10 INJECTION, EMULSION INTRAVENOUS at 07:30

## 2024-03-13 RX ADMIN — FENTANYL CITRATE 50 MCG: 50 INJECTION, SOLUTION INTRAMUSCULAR; INTRAVENOUS at 08:04

## 2024-03-13 RX ADMIN — PROPOFOL 100 MCG/KG/MIN: 10 INJECTION, EMULSION INTRAVENOUS at 07:31

## 2024-03-13 RX ADMIN — FENTANYL CITRATE 50 MCG: 50 INJECTION, SOLUTION INTRAMUSCULAR; INTRAVENOUS at 07:30

## 2024-03-13 RX ADMIN — SODIUM CHLORIDE, POTASSIUM CHLORIDE, SODIUM LACTATE AND CALCIUM CHLORIDE: 600; 310; 30; 20 INJECTION, SOLUTION INTRAVENOUS at 07:30

## 2024-03-13 RX ADMIN — MIDAZOLAM HYDROCHLORIDE 2 MG: 1 INJECTION, SOLUTION INTRAMUSCULAR; INTRAVENOUS at 07:30

## 2024-03-13 ASSESSMENT — PAIN SCALES - GENERAL: PAINLEVEL_OUTOF10: 3

## 2024-03-13 ASSESSMENT — PAIN DESCRIPTION - ORIENTATION: ORIENTATION: LEFT

## 2024-03-13 ASSESSMENT — PAIN DESCRIPTION - PAIN TYPE: TYPE: SURGICAL PAIN

## 2024-03-13 ASSESSMENT — PAIN DESCRIPTION - LOCATION: LOCATION: OTHER (COMMENT)

## 2024-03-13 ASSESSMENT — PAIN DESCRIPTION - DESCRIPTORS
DESCRIPTORS: ACHING
DESCRIPTORS: ACHING

## 2024-03-13 ASSESSMENT — PAIN - FUNCTIONAL ASSESSMENT: PAIN_FUNCTIONAL_ASSESSMENT: 0-10

## 2024-03-13 NOTE — ANESTHESIA PRE PROCEDURE
Induction: intravenous.      Anesthetic plan and risks discussed with patient.        Attending anesthesiologist reviewed and agrees with Preprocedure content              Ned Montoya MD   3/13/2024

## 2024-03-13 NOTE — DISCHARGE INSTRUCTIONS
DISCHARGE SUMMARY from your Nurse    The following personal items collected during your admission are returned to you:   Dental Appliance:    Vision:    Hearing Aid:    Jewelry:    Clothing:    Other Valuables:    Valuables sent to safe: Dose (mL/hr) Propofol : 0 mL/hr      PATIENT INSTRUCTIONS:    After general anesthesia or intravenous sedation, for 24 hours or while taking prescription Narcotics:  Limit your activities  Do not drive and operate hazardous machinery  Do not make important personal or business decisions  Do  not drink alcoholic beverages  If there is redness at the IV site you should apply a warm compress to the area.  If redness or soreness persist call your primary care physician.      These are general instructions for a healthy lifestyle:    *  Please give a list of your current medications to your Primary Care Provider.  *  Please update this list whenever your medications are discontinued, doses are      changed, or new medications (including over-the-counter products) are added.  *  Please carry medication information at all times in case of emergency situations.    No smoking / No tobacco products / Avoid exposure to second hand smoke    Surgeon General's Warning:  Quitting smoking now greatly reduces serious risk to your health.    Obesity, smoking, and sedentary lifestyle greatly increases your risk for illness and disease.  A healthy diet, regular physical exercise & weight monitoring are important for maintaining a healthy lifestyle.      Congestive Heart Failure  You may be retaining fluid if you have a history of heart failure or if you experience any of the following symptoms:  Weight gain of 3 pounds or more overnight or 5 pounds in a week, increased swelling in our hands or feet or shortness of breath while lying flat in bed.  Please call your doctor as soon as you notice any of these symptoms; do not wait until your next office visit.    Recognize signs and symptoms of STROKE:  F -  phone.  Dr Nikki Contreras.  [phone]

## 2024-03-13 NOTE — OP NOTE
Operative Note  [facility]  [address], [address]       Patient: Lizabeth Billings  YOB: 1955  MRN: [id number]    Date of Procedure: 3/13/2024    Pre-Op Diagnosis Codes:     * Scarring, hypertrophic [L91.0]    Post-Op Diagnosis: Same       Procedure(s):  SCAR REVISION LEFT AXILLARY INCISION    Surgeon(s):  Nikki Contreras MD    Assistant:   Surgical Assistant: Sarah Davis    Anesthesia: Monitor Anesthesia Care    Estimated Blood Loss (mL): Minimal    Complications: None    Specimens:   ID Type Source Tests Collected by Time Destination   1 : Left axillary hypertrophic scar Tissue Tissue SURGICAL PATHOLOGY Nikki Contreras MD 3/13/2024 0749        Implants:  * No implants in log *      Drains: * No LDAs found *    Findings: very small seroma    This procedure was not performed to treat primary cutaneous melanoma through wide local excision      Detailed Description of Procedure:   Pt was brought into the operating room and placed on the table in a supine position.  She was sedated with IV sedation.  The LEFT axilla was prepped and draped in the usual sterile fashion.  O.5% marcaine was used for local anesthesia and post op pain relief.  The LEFT axillary scar was excised.  The underlying tissue was debrided.  A small seroma was drained (<10cc).  Hemostasis was controlled with electrocautery and surgicel powder.  The axillary tissue was re-approximated with 3-0 vicryl sutures.  The dermis was closed with interrupted 3-0 vicryl sutures and the skin was closed with running 4-0 monocryl suture.  The wound was dressed with steristrips.  The patient was awakened and taken to the recovery room.   Sponge, needle and instrument counts were reported to be correct.      Electronically signed by Nikki Contreras MD on 3/13/2024 at 8:10 AM

## 2024-03-13 NOTE — INTERVAL H&P NOTE
Update History & Physical    The patient's History and Physical of March 5, 2024 was reviewed with the patient and I examined the patient. There was no change. The surgical site was confirmed by the patient and me.     Plan: The risks, benefits, expected outcome, and alternative to the recommended procedure have been discussed with the patient. Patient understands and wants to proceed with the procedure.     Electronically signed by Nikki Contreras MD on 3/13/2024 at 7:01 AM

## 2024-03-13 NOTE — ANESTHESIA POSTPROCEDURE EVALUATION
Department of Anesthesiology  Postprocedure Note    Patient: Lizabeth Billings  MRN: [id number]  YOB: 1955  Date of evaluation: 3/13/2024    Procedure Summary       Date: 03/13/24 Room / Location: [facility] ASU OR  / [facility] AMBULATORY OR    Anesthesia Start: 0730 Anesthesia Stop: 0824    Procedure: SCAR REVISION LEFT AXILLARY INCISION (Left: Axilla) Diagnosis:       Scarring, hypertrophic      (Scarring, hypertrophic [L91.0])    Surgeons: Nikki Contreras MD Responsible Provider: Ned Montoya MD    Anesthesia Type: TIVA, MAC ASA Status: 2            Anesthesia Type: No value filed.    Daja Phase I: Daja Score: 9    Daja Phase II:      Anesthesia Post Evaluation    Patient location during evaluation: PACU  Patient participation: complete - patient participated  Level of consciousness: awake  Airway patency: patent  Nausea & Vomiting: no vomiting and no nausea  Cardiovascular status: hemodynamically stable  Respiratory status: acceptable  Hydration status: stable  Pain management: adequate    No notable events documented.

## 2024-03-19 ENCOUNTER — OFFICE VISIT (OUTPATIENT)
Age: 69
End: 2024-03-19

## 2024-03-19 VITALS — WEIGHT: 173 LBS | BODY MASS INDEX: 27.15 KG/M2 | HEIGHT: 67 IN

## 2024-03-19 DIAGNOSIS — N64.89 SEROMA OF BREAST: Primary | ICD-10-CM

## 2024-03-19 PROCEDURE — 99024 POSTOP FOLLOW-UP VISIT: CPT | Performed by: SURGERY

## 2024-03-19 NOTE — PROGRESS NOTES
HISTORY OF PRESENT ILLNESS  Lizabeth Billings is a 68 y.o. female     HPI ESTABLISHED Patient POD # 6 scar revision LEFT axillary presents with swelling and drainage from the incision.  Also swelling at lumpectomy site (6 weeks post op)    2/2024 LEFT lumpectomy UIQ 2cm invasive ductal carcinoma, grade 2, 0/2, %, %, Her2 neg, Ki 8%    No XRT, anastrozole  Oncotype 6    Review of Systems      Physical Exam  Chest:   Breasts:     Left: Skin change (incisions healing well) present. No swelling.            ASPIRATION OF SEROMA  Indication : Seroma:  left breast and axilla  Prep : Alcohol.   Guidance : Ultrasound guidance.    Yield :  axilla: 10cc and breast 20cc of serosanguinous fluid was aspirated with an 18 gauge needle.  Effect : Seromas completely aspirated.  Tolerance: Pt tolerated the procedure with minimal discomfort  Disposition:  Follow up in one week if swelling recurs     ASSESSMENT and PLAN   Diagnosis Orders   1. Seroma of breast          Breast and axillary seromas aspirated

## 2024-04-01 ENCOUNTER — HOSPITAL ENCOUNTER (OUTPATIENT)
Facility: HOSPITAL | Age: 69
Discharge: HOME | End: 2024-04-04
Attending: INTERNAL MEDICINE
Payer: MEDICARE

## 2024-04-01 DIAGNOSIS — Z78.0 POSTMENOPAUSAL: ICD-10-CM

## 2024-04-01 PROCEDURE — 77080 DXA BONE DENSITY AXIAL: CPT

## 2024-04-25 ENCOUNTER — TELEPHONE (OUTPATIENT)
Age: 69
End: 2024-04-25

## 2024-04-25 NOTE — TELEPHONE ENCOUNTER
[facility] [place] at [facility]  [phone]    04/25/24 1:28 PM EDT - Called patient back to discuss her questions. Patient stated that she has been having a lot of fatigue since starting the anastrozole. She is currently taking it in the morning. Advised patient to switch to taking it at night. Patient also stated that she has started having a little bit of joint pain. Advised patient to try tart cherry juice to help with the joint pain. Patient is going to try both options and will reach back out in about two weeks to let us know if there has been any improvement.

## 2024-05-13 ENCOUNTER — TELEPHONE (OUTPATIENT)
Facility: HOSPITAL | Age: 69
End: 2024-05-13

## 2024-05-13 NOTE — TELEPHONE ENCOUNTER
Pt called with concerns r/t anastrozole. Mainly blurry vision and dry mouth. Joint pain is still present, but better after drinking tart cherry juice. Her f/u appt is in June, but she is asking to be seen sooner to potentially change medications. Sent message to Dr. Chase and LYNN Babb, MONTSERRAT. Will update pt when able.    Addendum:   Per Dr. Chase, pt to stop taking the AI and make an appointment to be seen in 2 weeks. Called pt and provided this update. Will ask a psr to assist in rescheduling pt's appointment.         ESSENCE Hoffmann, RN, CMSRN  Breast Cancer Nurse Navigator    [facility] [place]  [address]   [address]  [place] [address]  W: [phone]  F: [phone]  [email or web address]   Good Help to Those in Need®

## 2024-05-23 ENCOUNTER — OFFICE VISIT (OUTPATIENT)
Age: 69
End: 2024-05-23
Payer: MEDICARE

## 2024-05-23 ENCOUNTER — TELEPHONE (OUTPATIENT)
Age: 69
End: 2024-05-23

## 2024-05-23 VITALS
RESPIRATION RATE: 16 BRPM | OXYGEN SATURATION: 98 % | WEIGHT: 177 LBS | DIASTOLIC BLOOD PRESSURE: 80 MMHG | SYSTOLIC BLOOD PRESSURE: 110 MMHG | TEMPERATURE: 98.2 F | HEART RATE: 80 BPM | BODY MASS INDEX: 27.72 KG/M2

## 2024-05-23 DIAGNOSIS — C50.212 MALIGNANT NEOPLASM OF UPPER-INNER QUADRANT OF LEFT BREAST IN FEMALE, ESTROGEN RECEPTOR POSITIVE (HCC): ICD-10-CM

## 2024-05-23 DIAGNOSIS — Z17.0 MALIGNANT NEOPLASM OF UPPER-INNER QUADRANT OF LEFT BREAST IN FEMALE, ESTROGEN RECEPTOR POSITIVE (HCC): ICD-10-CM

## 2024-05-23 DIAGNOSIS — F41.9 ANXIETY: Primary | ICD-10-CM

## 2024-05-23 PROCEDURE — G8419 CALC BMI OUT NRM PARAM NOF/U: HCPCS | Performed by: NURSE PRACTITIONER

## 2024-05-23 PROCEDURE — 1036F TOBACCO NON-USER: CPT | Performed by: NURSE PRACTITIONER

## 2024-05-23 PROCEDURE — 3074F SYST BP LT 130 MM HG: CPT | Performed by: NURSE PRACTITIONER

## 2024-05-23 PROCEDURE — 3079F DIAST BP 80-89 MM HG: CPT | Performed by: NURSE PRACTITIONER

## 2024-05-23 PROCEDURE — G8427 DOCREV CUR MEDS BY ELIG CLIN: HCPCS | Performed by: NURSE PRACTITIONER

## 2024-05-23 PROCEDURE — 1123F ACP DISCUSS/DSCN MKR DOCD: CPT | Performed by: NURSE PRACTITIONER

## 2024-05-23 PROCEDURE — 3017F COLORECTAL CA SCREEN DOC REV: CPT | Performed by: NURSE PRACTITIONER

## 2024-05-23 PROCEDURE — 99214 OFFICE O/P EST MOD 30 MIN: CPT | Performed by: NURSE PRACTITIONER

## 2024-05-23 PROCEDURE — G8399 PT W/DXA RESULTS DOCUMENT: HCPCS | Performed by: NURSE PRACTITIONER

## 2024-05-23 PROCEDURE — 1090F PRES/ABSN URINE INCON ASSESS: CPT | Performed by: NURSE PRACTITIONER

## 2024-05-23 RX ORDER — HYDROXYZINE HYDROCHLORIDE 25 MG/1
25 TABLET, FILM COATED ORAL EVERY 8 HOURS PRN
Qty: 90 TABLET | Refills: 3 | Status: SHIPPED | OUTPATIENT
Start: 2024-05-23

## 2024-05-23 RX ORDER — EXEMESTANE 25 MG/1
25 TABLET ORAL DAILY
Qty: 90 TABLET | Refills: 3 | Status: SHIPPED | OUTPATIENT
Start: 2024-05-23

## 2024-05-23 NOTE — PROGRESS NOTES
Lizabeth Billings is a 69 y.o. female here today to follow up for breast cancer     1. Have you been to the ER, urgent care clinic since your last visit?  Hospitalized since your last visit?no    2. Have you seen or consulted any other health care providers outside of the [facility] since your last visit?  Include any pap smears or colon screening. no

## 2024-05-23 NOTE — PATIENT INSTRUCTIONS
You can reach out to the following to help with your anxiety/depression.   [facility]  [facility]  [facility]

## 2024-05-23 NOTE — PROGRESS NOTES
[facility] [place] at [facility] [place]  [address], [address] [facility] [address]  W: [phone]  F: [phone]      Reason for Visit:   Lizabeth Billings is a 69 y.o. female who is seen in follow up for breast cancer.    Breast Surgeon: Dr. Contreras    Treatment History:   1/23/24 left breast mass core bx:  IDC, gr 2, 12 mm, ER + at 100%, NE + at 100%, HER 2 negative (IHC2+, DISH ratio 1.4; sig/cell 2.1), ki67 8%  2/7/24 Left breast tissue lumpectomy:  IDC, gr 2, 2 cm, 0/2 LN, no LVI, pT1c pN0 cM0  Oncotype Dx = 6, RR 3%  Anastrozole 3/4/24 - 5/13/24 (joint pain, fatigue)  Exemestane 5/24/34 -     History of Present Illness:   An abnormal mammogram led to the pathology above    Interval Hx: Patient presents gr 1 anxiety, gr 1 constipation, gr 1 diarrhea, gr 1 fatigue, gr 1 nausea, gr 2 loss of cognition/concentration, gr 1 pain/cramping, gr 2 cough, gr 1 dyspnea, gr 1 neuropathy.     FH:  MGGM with breast cancer at age 47    Past Medical History:   Diagnosis Date    Atypical ductal hyperplasia of breast 2012    Colitis     Depression with anxiety 8/8/2016    GERD (gastroesophageal reflux disease) past week    reflux only occasionally    History of migraine     age 22's    Hormone replacement therapy     Last Dose 1/2024    Hx of bone density study 10/19/18, 2008    Normal, repeat 10 years    Hypertension     IBS (irritable bowel syndrome)     Lichen sclerosus et atrophicus of the vulva 1995    to [facility]/symptom free now (2024)    Menopausal syndrome     Neck pain, chronic     Other ill-defined conditions(799.89)     old neck injury/pain comes & goes    PONV (postoperative nausea and vomiting)     Seasonal allergies     Unspecified adverse effect of anesthesia     C/O \"waking up\" during one SX    Urinary frequency       Past Surgical History:   Procedure Laterality Date    BIOPSY OF BREAST, INCISIONAL  9/2012    RIGHT for ADH    BREAST BIOPSY Right 09/11/2012

## 2024-07-02 ENCOUNTER — TELEPHONE (OUTPATIENT)
Facility: HOSPITAL | Age: 69
End: 2024-07-02

## 2024-07-02 NOTE — TELEPHONE ENCOUNTER
Pt called this morning saying that side effects from Exemestane are intolerable. She was initially on Anastrozole and switched to Exemestane (end of May) d/t same side effects.    She is taking at bedtime and still experiencing nausea, difficulty focusing/vision issues during the day. She states it is impacting her work and quality of life. She says she stopped about a week and a half ago and has since been feeling better.    Sent a message to Xiomara Babb NP and team. Will update pt when able.     Addendum:  MONTSERRAT Solano advised pt to continue to stay off of Exemestane and come in to discuss alternative medication. Sent message to kushal Burgess to schedule appointment. Called pt to let her know the plan. Pt was agreeable.         ESSENCE Hoffmann, RN, CMSRN  Breast Cancer Nurse Navigator    [facility] [place]  [address]   [address]  [place] [address]  W: [phone]  F: [phone]  [email or web address]   Good Help to Those in Need®

## 2024-07-08 ENCOUNTER — OFFICE VISIT (OUTPATIENT)
Age: 69
End: 2024-07-08
Payer: MEDICARE

## 2024-07-08 VITALS
HEART RATE: 80 BPM | RESPIRATION RATE: 16 BRPM | OXYGEN SATURATION: 100 % | DIASTOLIC BLOOD PRESSURE: 82 MMHG | SYSTOLIC BLOOD PRESSURE: 112 MMHG | TEMPERATURE: 98.2 F

## 2024-07-08 DIAGNOSIS — C50.212 MALIGNANT NEOPLASM OF UPPER-INNER QUADRANT OF LEFT BREAST IN FEMALE, ESTROGEN RECEPTOR POSITIVE (HCC): Primary | ICD-10-CM

## 2024-07-08 DIAGNOSIS — F33.9 RECURRENT DEPRESSION (HCC): ICD-10-CM

## 2024-07-08 DIAGNOSIS — Z17.0 MALIGNANT NEOPLASM OF UPPER-INNER QUADRANT OF LEFT BREAST IN FEMALE, ESTROGEN RECEPTOR POSITIVE (HCC): Primary | ICD-10-CM

## 2024-07-08 PROCEDURE — 1090F PRES/ABSN URINE INCON ASSESS: CPT | Performed by: NURSE PRACTITIONER

## 2024-07-08 PROCEDURE — 1036F TOBACCO NON-USER: CPT | Performed by: NURSE PRACTITIONER

## 2024-07-08 PROCEDURE — 3074F SYST BP LT 130 MM HG: CPT | Performed by: NURSE PRACTITIONER

## 2024-07-08 PROCEDURE — G8419 CALC BMI OUT NRM PARAM NOF/U: HCPCS | Performed by: NURSE PRACTITIONER

## 2024-07-08 PROCEDURE — 99214 OFFICE O/P EST MOD 30 MIN: CPT | Performed by: NURSE PRACTITIONER

## 2024-07-08 PROCEDURE — 1123F ACP DISCUSS/DSCN MKR DOCD: CPT | Performed by: NURSE PRACTITIONER

## 2024-07-08 PROCEDURE — 3079F DIAST BP 80-89 MM HG: CPT | Performed by: NURSE PRACTITIONER

## 2024-07-08 PROCEDURE — 3017F COLORECTAL CA SCREEN DOC REV: CPT | Performed by: NURSE PRACTITIONER

## 2024-07-08 PROCEDURE — G8428 CUR MEDS NOT DOCUMENT: HCPCS | Performed by: NURSE PRACTITIONER

## 2024-07-08 PROCEDURE — G8399 PT W/DXA RESULTS DOCUMENT: HCPCS | Performed by: NURSE PRACTITIONER

## 2024-07-08 NOTE — PROGRESS NOTES
[facility] Cancer [place] at [facility]  [phone]    07/08/24 Lizabeth Billings is a 69 y.o. female is here for a follow up for breast cancer.    1. Have you been to the ER, urgent care clinic since your last visit?  Hospitalized since your last visit?No    2. Have you seen or consulted any other health care providers outside of the [facility] since your last visit?  Include any pap smears or colon screening. No    Symptoms while on Exemestane included dry mouth, can't focus, tired, stopped around 6/24/24.    Concentration hasn't improved. Tiredness hasn't really improved, has depression but fatigue is worse. Blurred vision is another side effect as well. Not really improved off the medication but does notice that it is worse while on the medication. Balance issues and dizziness.

## 2024-07-08 NOTE — PROGRESS NOTES
Cancer [place] at [facility]  [address] [facility] [address]  W: [phone]  F: [phone]      Reason for Visit:   Lizabeth Billings is a 69 y.o. female who is seen in follow up for breast cancer.    Breast Surgeon: Dr. Contreras    Treatment History:   1/23/24 left breast mass core bx:  IDC, gr 2, 12 mm, ER + at 100%, WI + at 100%, HER 2 negative (IHC2+, DISH ratio 1.4; sig/cell 2.1), ki67 8%  2/7/24 Left breast tissue lumpectomy:  IDC, gr 2, 2 cm, 0/2 LN, no LVI, pT1c pN0 cM0  Oncotype Dx = 6, RR 3%  Anastrozole 3/4/24 - 5/13/24 (joint pain, fatigue)  Exemestane 5/24/34 - 6/24/24 (fatigue, depression)    History of Present Illness:   An abnormal mammogram led to the pathology above    Interval Hx: Patient presents today for follow up on exemestane. Reports gr 2 loss of appetite, gr 1 constipation, gr 2 diarrhea, gr 3 fatigue, gr 2 nausea, gr 2 hot flashes, gr 2 insomnia, gr 2 loss of cognition/concentration, gr 2 anxiety/depression, gr 1 cough, gr 1 dyspnea, gr 1 itching, gr 1 headache, gr 1 loss of libido, gr 1 vaginal dryness.     She reports nausea is intermittent and she always has a \"sensitive stomach.\" This episode started this weekend. She feels this has improved some, will let us know if this does not improve or worsens.     She feels much of her stress is related to her job at her [employer]. She feels she would feel better if she resigns from this as she is retired but was trying to help.     FH:  MGGM with breast cancer at age 47    Past Medical History:   Diagnosis Date    Atypical ductal hyperplasia of breast 2012    Colitis     Depression with anxiety 8/8/2016    GERD (gastroesophageal reflux disease) past week    reflux only occasionally    History of migraine     age 20's    Hormone replacement therapy     Last Dose 1/2024    Hx of bone density study 10/19/18, 2008    Normal, repeat 10 years    Hypertension     IBS (irritable bowel syndrome)     Lichen

## 2024-07-24 RX ORDER — SOLIFENACIN SUCCINATE 10 MG/1
10 TABLET, FILM COATED ORAL DAILY
Qty: 90 TABLET | Refills: 0 | Status: SHIPPED | OUTPATIENT
Start: 2024-07-24

## 2024-07-24 NOTE — TELEPHONE ENCOUNTER
69 year old patient last seen in the office on 5/30/2023 for ae and has next ae on 8/16/2024    Please advise regarding pended medication refill    Requested prescription last sent on 11/1/2023 for 9 month supply    Please review and advise    Thank you

## 2024-08-28 ENCOUNTER — TELEPHONE (OUTPATIENT)
Facility: HOSPITAL | Age: 69
End: 2024-08-28

## 2024-08-28 NOTE — TELEPHONE ENCOUNTER
Pt called to discuss SE from Exemestane. She thought she was doing okay on it at first, but then dizziness, vertigo, and nausea has become unbearable. So much so, she cannot drive and has had to resign from her job.     She says at one point the dizziness and vomiting was so severe, her sister took her to [facility] Emergency [facility]. She states CT head and C-spine were performed, results showed no abnormality aside from neck muscle inflammation. She was discharged with anti-emetics and NSAIDs.     Sent a message to Xiomara Babb NP. Will update pt when able.           ESSENCE Hoffmann, RN, CMSRN  Breast Cancer Nurse Navigator    [facility]  [address]   [address]  [place] [address]  W: [phone]  F: [phone]  [email or web address]   Good Help to Those in Need®

## 2024-08-29 ENCOUNTER — TELEPHONE (OUTPATIENT)
Age: 69
End: 2024-08-29

## 2024-08-29 NOTE — TELEPHONE ENCOUNTER
[facility] [place] at [facility]  [phone]    08/29/24 4:53 PM EDT - Called patient and left a message telling her to stop the exemestane and that we wanted to add her onto our schedule.

## 2024-09-06 ENCOUNTER — HOSPITAL ENCOUNTER (OUTPATIENT)
Facility: HOSPITAL | Age: 69
Discharge: HOME | End: 2024-09-09
Attending: SURGERY
Payer: MEDICARE

## 2024-09-06 DIAGNOSIS — L91.0 HYPERTROPHIC SCAR: ICD-10-CM

## 2024-09-06 DIAGNOSIS — Z85.3 HX: BREAST CANCER: ICD-10-CM

## 2024-09-06 PROCEDURE — 77066 DX MAMMO INCL CAD BI: CPT

## 2024-09-09 ENCOUNTER — OFFICE VISIT (OUTPATIENT)
Age: 69
End: 2024-09-09
Payer: MEDICARE

## 2024-09-09 VITALS
WEIGHT: 177.6 LBS | TEMPERATURE: 98.2 F | BODY MASS INDEX: 27.88 KG/M2 | DIASTOLIC BLOOD PRESSURE: 84 MMHG | OXYGEN SATURATION: 97 % | HEIGHT: 67 IN | HEART RATE: 94 BPM | SYSTOLIC BLOOD PRESSURE: 112 MMHG | RESPIRATION RATE: 16 BRPM

## 2024-09-09 DIAGNOSIS — C50.212 MALIGNANT NEOPLASM OF UPPER-INNER QUADRANT OF LEFT BREAST IN FEMALE, ESTROGEN RECEPTOR POSITIVE (HCC): Primary | ICD-10-CM

## 2024-09-09 DIAGNOSIS — Z17.0 MALIGNANT NEOPLASM OF UPPER-INNER QUADRANT OF LEFT BREAST IN FEMALE, ESTROGEN RECEPTOR POSITIVE (HCC): Primary | ICD-10-CM

## 2024-09-09 PROCEDURE — 3074F SYST BP LT 130 MM HG: CPT | Performed by: INTERNAL MEDICINE

## 2024-09-09 PROCEDURE — G8427 DOCREV CUR MEDS BY ELIG CLIN: HCPCS | Performed by: INTERNAL MEDICINE

## 2024-09-09 PROCEDURE — 99214 OFFICE O/P EST MOD 30 MIN: CPT | Performed by: INTERNAL MEDICINE

## 2024-09-09 PROCEDURE — 3079F DIAST BP 80-89 MM HG: CPT | Performed by: INTERNAL MEDICINE

## 2024-09-09 PROCEDURE — G8399 PT W/DXA RESULTS DOCUMENT: HCPCS | Performed by: INTERNAL MEDICINE

## 2024-09-09 PROCEDURE — G8419 CALC BMI OUT NRM PARAM NOF/U: HCPCS | Performed by: INTERNAL MEDICINE

## 2024-09-09 PROCEDURE — 1036F TOBACCO NON-USER: CPT | Performed by: INTERNAL MEDICINE

## 2024-09-09 PROCEDURE — 1090F PRES/ABSN URINE INCON ASSESS: CPT | Performed by: INTERNAL MEDICINE

## 2024-09-09 PROCEDURE — 3017F COLORECTAL CA SCREEN DOC REV: CPT | Performed by: INTERNAL MEDICINE

## 2024-09-09 PROCEDURE — 1123F ACP DISCUSS/DSCN MKR DOCD: CPT | Performed by: INTERNAL MEDICINE

## 2024-09-09 PROCEDURE — G2211 COMPLEX E/M VISIT ADD ON: HCPCS | Performed by: INTERNAL MEDICINE

## 2024-09-09 RX ORDER — TAMOXIFEN CITRATE 20 MG/1
20 TABLET ORAL DAILY
Qty: 30 TABLET | Refills: 5 | Status: SHIPPED | OUTPATIENT
Start: 2024-09-09

## 2024-09-09 ASSESSMENT — PATIENT HEALTH QUESTIONNAIRE - PHQ9
SUM OF ALL RESPONSES TO PHQ9 QUESTIONS 1 & 2: 0
SUM OF ALL RESPONSES TO PHQ QUESTIONS 1-9: 0
SUM OF ALL RESPONSES TO PHQ QUESTIONS 1-9: 0
1. LITTLE INTEREST OR PLEASURE IN DOING THINGS: NOT AT ALL
SUM OF ALL RESPONSES TO PHQ QUESTIONS 1-9: 0
SUM OF ALL RESPONSES TO PHQ QUESTIONS 1-9: 0
2. FEELING DOWN, DEPRESSED OR HOPELESS: NOT AT ALL

## 2024-09-17 ENCOUNTER — TELEPHONE (OUTPATIENT)
Age: 69
End: 2024-09-17

## 2024-09-17 NOTE — TELEPHONE ENCOUNTER
Called pt and left vm to schedule appt. The referral came from [facility] for Vertigo.

## 2024-10-28 RX ORDER — SOLIFENACIN SUCCINATE 10 MG/1
10 TABLET, FILM COATED ORAL DAILY
Qty: 90 TABLET | Refills: 0 | OUTPATIENT
Start: 2024-10-28

## 2024-10-31 RX ORDER — SOLIFENACIN SUCCINATE 10 MG/1
10 TABLET, FILM COATED ORAL DAILY
Qty: 90 TABLET | Refills: 0 | OUTPATIENT
Start: 2024-10-31

## 2024-11-05 ENCOUNTER — TELEPHONE (OUTPATIENT)
Age: 69
End: 2024-11-05

## 2024-11-06 RX ORDER — SOLIFENACIN SUCCINATE 10 MG/1
10 TABLET, FILM COATED ORAL DAILY
Qty: 90 TABLET | Refills: 3 | Status: SHIPPED | OUTPATIENT
Start: 2024-11-06

## 2024-11-06 NOTE — TELEPHONE ENCOUNTER
Shakir De La Cruz MD   to Me         11/6/24  7:56 AM  Rx sent for a year        Patient advised of MD sent prescription to her pharmacy and appointment confirmed for 12/6/2024.    Patient verbalized understanding.

## 2024-12-05 ENCOUNTER — HOSPITAL ENCOUNTER (OUTPATIENT)
Age: 69
Discharge: HOME | End: 2024-12-08
Payer: MEDICARE

## 2024-12-05 DIAGNOSIS — H81.13 BPPV (BENIGN PAROXYSMAL POSITIONAL VERTIGO), BILATERAL: ICD-10-CM

## 2024-12-05 DIAGNOSIS — R42 DIZZINESS: ICD-10-CM

## 2024-12-05 PROCEDURE — A9579 GAD-BASE MR CONTRAST NOS,1ML: HCPCS | Performed by: RADIOLOGY

## 2024-12-05 PROCEDURE — 6360000004 HC RX CONTRAST MEDICATION: Performed by: RADIOLOGY

## 2024-12-05 PROCEDURE — 70553 MRI BRAIN STEM W/O & W/DYE: CPT

## 2024-12-05 RX ADMIN — GADOTERIDOL 15 ML: 279.3 INJECTION, SOLUTION INTRAVENOUS at 08:43

## 2024-12-09 ENCOUNTER — TELEPHONE (OUTPATIENT)
Age: 69
End: 2024-12-09

## 2024-12-16 ENCOUNTER — OFFICE VISIT (OUTPATIENT)
Age: 69
End: 2024-12-16
Payer: MEDICARE

## 2024-12-16 VITALS
BODY MASS INDEX: 28.12 KG/M2 | OXYGEN SATURATION: 98 % | TEMPERATURE: 98.6 F | HEIGHT: 67 IN | DIASTOLIC BLOOD PRESSURE: 69 MMHG | WEIGHT: 179.2 LBS | HEART RATE: 83 BPM | RESPIRATION RATE: 16 BRPM | SYSTOLIC BLOOD PRESSURE: 116 MMHG

## 2024-12-16 DIAGNOSIS — C50.212 MALIGNANT NEOPLASM OF UPPER-INNER QUADRANT OF LEFT BREAST IN FEMALE, ESTROGEN RECEPTOR POSITIVE (HCC): Primary | ICD-10-CM

## 2024-12-16 DIAGNOSIS — Z17.0 MALIGNANT NEOPLASM OF UPPER-INNER QUADRANT OF LEFT BREAST IN FEMALE, ESTROGEN RECEPTOR POSITIVE (HCC): Primary | ICD-10-CM

## 2024-12-16 PROCEDURE — 3017F COLORECTAL CA SCREEN DOC REV: CPT | Performed by: INTERNAL MEDICINE

## 2024-12-16 PROCEDURE — 1123F ACP DISCUSS/DSCN MKR DOCD: CPT | Performed by: INTERNAL MEDICINE

## 2024-12-16 PROCEDURE — 99214 OFFICE O/P EST MOD 30 MIN: CPT | Performed by: INTERNAL MEDICINE

## 2024-12-16 PROCEDURE — G8427 DOCREV CUR MEDS BY ELIG CLIN: HCPCS | Performed by: INTERNAL MEDICINE

## 2024-12-16 PROCEDURE — G2211 COMPLEX E/M VISIT ADD ON: HCPCS | Performed by: INTERNAL MEDICINE

## 2024-12-16 PROCEDURE — G8484 FLU IMMUNIZE NO ADMIN: HCPCS | Performed by: INTERNAL MEDICINE

## 2024-12-16 PROCEDURE — 1036F TOBACCO NON-USER: CPT | Performed by: INTERNAL MEDICINE

## 2024-12-16 PROCEDURE — 3074F SYST BP LT 130 MM HG: CPT | Performed by: INTERNAL MEDICINE

## 2024-12-16 PROCEDURE — G8399 PT W/DXA RESULTS DOCUMENT: HCPCS | Performed by: INTERNAL MEDICINE

## 2024-12-16 PROCEDURE — G8419 CALC BMI OUT NRM PARAM NOF/U: HCPCS | Performed by: INTERNAL MEDICINE

## 2024-12-16 PROCEDURE — 1160F RVW MEDS BY RX/DR IN RCRD: CPT | Performed by: INTERNAL MEDICINE

## 2024-12-16 PROCEDURE — 1090F PRES/ABSN URINE INCON ASSESS: CPT | Performed by: INTERNAL MEDICINE

## 2024-12-16 PROCEDURE — 1159F MED LIST DOCD IN RCRD: CPT | Performed by: INTERNAL MEDICINE

## 2024-12-16 PROCEDURE — 3078F DIAST BP <80 MM HG: CPT | Performed by: INTERNAL MEDICINE

## 2024-12-16 PROCEDURE — 1126F AMNT PAIN NOTED NONE PRSNT: CPT | Performed by: INTERNAL MEDICINE

## 2024-12-16 ASSESSMENT — PATIENT HEALTH QUESTIONNAIRE - PHQ9
2. FEELING DOWN, DEPRESSED OR HOPELESS: NEARLY EVERY DAY
9. THOUGHTS THAT YOU WOULD BE BETTER OFF DEAD, OR OF HURTING YOURSELF: NOT AT ALL
8. MOVING OR SPEAKING SO SLOWLY THAT OTHER PEOPLE COULD HAVE NOTICED. OR THE OPPOSITE, BEING SO FIGETY OR RESTLESS THAT YOU HAVE BEEN MOVING AROUND A LOT MORE THAN USUAL: NOT AT ALL
4. FEELING TIRED OR HAVING LITTLE ENERGY: MORE THAN HALF THE DAYS
SUM OF ALL RESPONSES TO PHQ QUESTIONS 1-9: 16
3. TROUBLE FALLING OR STAYING ASLEEP: NEARLY EVERY DAY
SUM OF ALL RESPONSES TO PHQ QUESTIONS 1-9: 16
7. TROUBLE CONCENTRATING ON THINGS, SUCH AS READING THE NEWSPAPER OR WATCHING TELEVISION: NEARLY EVERY DAY
10. IF YOU CHECKED OFF ANY PROBLEMS, HOW DIFFICULT HAVE THESE PROBLEMS MADE IT FOR YOU TO DO YOUR WORK, TAKE CARE OF THINGS AT HOME, OR GET ALONG WITH OTHER PEOPLE: VERY DIFFICULT
SUM OF ALL RESPONSES TO PHQ9 QUESTIONS 1 & 2: 3
6. FEELING BAD ABOUT YOURSELF - OR THAT YOU ARE A FAILURE OR HAVE LET YOURSELF OR YOUR FAMILY DOWN: MORE THAN HALF THE DAYS
SUM OF ALL RESPONSES TO PHQ QUESTIONS 1-9: 16
5. POOR APPETITE OR OVEREATING: NEARLY EVERY DAY
SUM OF ALL RESPONSES TO PHQ QUESTIONS 1-9: 16
1. LITTLE INTEREST OR PLEASURE IN DOING THINGS: NOT AT ALL

## 2024-12-16 NOTE — PROGRESS NOTES
Lizabeth Billings is a 69 y.o. female is here today for a breast cancer follow up.    1. Have you been to the ER, urgent care clinic since your last visit?  Hospitalized since your last visit?No    2. Have you seen or consulted any other health care providers outside of the [facility] since your last visit?  Include any pap smears or colon screening. No    PHQ-9 Total Score: 16 (12/16/2024 10:35 AM)  Thoughts that you would be better off dead, or of hurting yourself in some way: 0 (12/16/2024 10:35 AM)      
tablet Take 1 tablet by mouth daily    hydrOXYzine HCl (ATARAX) 25 MG tablet Take 1 tablet by mouth every 8 hours as needed for Anxiety    Fexofenadine HCl (ALLEGRA ALLERGY PO) Take by mouth every morning    ALBUTEROL IN Inhale into the lungs as needed    omeprazole (PRILOSEC) 40 MG delayed release capsule Take 1 capsule by mouth every morning    buPROPion (WELLBUTRIN SR) 150 MG extended release tablet Take 1 tablet by mouth daily    Cholecalciferol (VITAMIN D3) 125 MCG (5000 UT) TABS daily    buPROPion (WELLBUTRIN XL) 300 MG extended release tablet Take 1 tablet by mouth every morning    irbesartan (AVAPRO) 150 MG tablet Take 1 tablet by mouth every evening ceived the following from [facility]: Outside name: irbesartan (AVAPRO) 150 mg tablet    sertraline (ZOLOFT) 100 MG tablet Take 1 tablet by mouth every morning ceived the following from [facility]: Outside name: sertraline (ZOLOFT) 100 mg tablet     No current facility-administered medications for this visit.      Allergies   Allergen Reactions    Meperidine Other (See Comments)     Arm swelling/severe    Fish Oil Nausea Only        Review of Systems: A complete review of systems was obtained, negative except as described above.    Physical Exam:   /69 (Site: Left Upper Arm, Position: Sitting, Cuff Size: Large Adult)   Pulse 83   Temp 98.6 °F (37 °C) (Temporal)   Resp 16   Ht 1.702 m (5' 7\")   Wt 81.3 kg (179 lb 3.2 oz)   SpO2 98%   BMI 28.07 kg/m²   ECOG PS: 0    Constitutional: Appears well-developed and well-nourished in no apparent distress      Mental status: Alert and awake, Oriented to person/place/time, Able to follow commands    Eyes: EOM normal, Sclera normal, No visible ocular discharge    HENT: Normocephalic, atraumatic    Mouth/Throat: Moist mucous membranes    External Ears normal    Neck: No visualized mass    Pulmonary/Chest: Respiratory effort normal, No visualized signs of difficulty breathing or 
(0) indicator not present

## 2024-12-27 ENCOUNTER — TELEPHONE (OUTPATIENT)
Age: 69
End: 2024-12-27

## 2024-12-27 NOTE — TELEPHONE ENCOUNTER
Patient called and stated that she would like a call back to discuss whether or not if there is any update on getting her appt with Neurology moved up sooner to January.     # [phone]

## 2025-01-03 ENCOUNTER — TELEPHONE (OUTPATIENT)
Facility: HOSPITAL | Age: 70
End: 2025-01-03

## 2025-01-03 NOTE — TELEPHONE ENCOUNTER
Pt called with concerns about her neurology appointment being so delayed. Back in October she called and made an appointment for 4/7/25. She since has had brain MRI and a follow-up with Dr. Chase.     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 Pt states Dr. Chase told her she needs to be seen by neuro before the end of January. She has f/u with Dr. Chase scheduled on 1/27.     I sent a message to Dr. Santos and his RN, Yanique. Called pt back and let her know I was working on getting her seen sooner. Will update pt when able.         EDDIE HoffmannN, RN, CMSRN  Breast Cancer Nurse Navigator    [facility] [place]  [address]   [address]  [place] [address]  W: [phone]  F: [phone]  [email or web address]   Good Help to Those in Need®

## 2025-01-08 ENCOUNTER — TELEPHONE (OUTPATIENT)
Age: 70
End: 2025-01-08

## 2025-01-08 NOTE — TELEPHONE ENCOUNTER
Called patient back to let her know Salvatore's team is working on getting her a sooner Neurology appointment. No answer LVM relaying message and that we will give her a call back once we hear about her appointment.

## 2025-01-10 ENCOUNTER — TELEPHONE (OUTPATIENT)
Facility: HOSPITAL | Age: 70
End: 2025-01-10

## 2025-01-10 NOTE — TELEPHONE ENCOUNTER
Spoke to pt about status of moving up her neurology appointment. She says the dizziness is back again and she is worried given what her MRI showed and what Dr. Chase mentioned to her.     As of last week, JUVE Chanel was going to ask Dr. Veronica (Dr. Santos is out of the office until 1/21) about getting pt in within the next few weeks or early February. I had not heard back so I sent another message yesterday (1/9). I updated pt and apologized for the delay in getting her appointment moved up. Will continue to work on this and update Dr. Chase and team.         ESSENCE Hoffmann, RN, CMSRN  Breast Cancer Nurse Navigator    [facility]  [address]   [address]  [place] [address]  W: [phone]  F: [phone]  [email or web address]   Good Help to Those in Need®

## 2025-01-27 ENCOUNTER — OFFICE VISIT (OUTPATIENT)
Age: 70
End: 2025-01-27
Payer: MEDICARE

## 2025-01-27 VITALS
TEMPERATURE: 98.8 F | DIASTOLIC BLOOD PRESSURE: 81 MMHG | BODY MASS INDEX: 27.47 KG/M2 | SYSTOLIC BLOOD PRESSURE: 128 MMHG | HEART RATE: 82 BPM | RESPIRATION RATE: 18 BRPM | OXYGEN SATURATION: 97 % | WEIGHT: 175 LBS | HEIGHT: 67 IN

## 2025-01-27 DIAGNOSIS — Z17.0 MALIGNANT NEOPLASM OF UPPER-INNER QUADRANT OF LEFT BREAST IN FEMALE, ESTROGEN RECEPTOR POSITIVE (HCC): Primary | ICD-10-CM

## 2025-01-27 DIAGNOSIS — C50.212 MALIGNANT NEOPLASM OF UPPER-INNER QUADRANT OF LEFT BREAST IN FEMALE, ESTROGEN RECEPTOR POSITIVE (HCC): Primary | ICD-10-CM

## 2025-01-27 PROCEDURE — 1036F TOBACCO NON-USER: CPT | Performed by: INTERNAL MEDICINE

## 2025-01-27 PROCEDURE — G2211 COMPLEX E/M VISIT ADD ON: HCPCS | Performed by: INTERNAL MEDICINE

## 2025-01-27 PROCEDURE — 99214 OFFICE O/P EST MOD 30 MIN: CPT | Performed by: INTERNAL MEDICINE

## 2025-01-27 PROCEDURE — 3074F SYST BP LT 130 MM HG: CPT | Performed by: INTERNAL MEDICINE

## 2025-01-27 PROCEDURE — 3017F COLORECTAL CA SCREEN DOC REV: CPT | Performed by: INTERNAL MEDICINE

## 2025-01-27 PROCEDURE — 1159F MED LIST DOCD IN RCRD: CPT | Performed by: INTERNAL MEDICINE

## 2025-01-27 PROCEDURE — G8399 PT W/DXA RESULTS DOCUMENT: HCPCS | Performed by: INTERNAL MEDICINE

## 2025-01-27 PROCEDURE — G8419 CALC BMI OUT NRM PARAM NOF/U: HCPCS | Performed by: INTERNAL MEDICINE

## 2025-01-27 PROCEDURE — 1126F AMNT PAIN NOTED NONE PRSNT: CPT | Performed by: INTERNAL MEDICINE

## 2025-01-27 PROCEDURE — 1090F PRES/ABSN URINE INCON ASSESS: CPT | Performed by: INTERNAL MEDICINE

## 2025-01-27 PROCEDURE — G8427 DOCREV CUR MEDS BY ELIG CLIN: HCPCS | Performed by: INTERNAL MEDICINE

## 2025-01-27 PROCEDURE — 3079F DIAST BP 80-89 MM HG: CPT | Performed by: INTERNAL MEDICINE

## 2025-01-27 PROCEDURE — 1123F ACP DISCUSS/DSCN MKR DOCD: CPT | Performed by: INTERNAL MEDICINE

## 2025-01-27 PROCEDURE — 1160F RVW MEDS BY RX/DR IN RCRD: CPT | Performed by: INTERNAL MEDICINE

## 2025-01-27 RX ORDER — LETROZOLE 2.5 MG/1
2.5 TABLET, FILM COATED ORAL DAILY
Qty: 30 TABLET | Refills: 3 | Status: SHIPPED | OUTPATIENT
Start: 2025-01-27

## 2025-01-27 ASSESSMENT — PATIENT HEALTH QUESTIONNAIRE - PHQ9
SUM OF ALL RESPONSES TO PHQ QUESTIONS 1-9: 2
SUM OF ALL RESPONSES TO PHQ QUESTIONS 1-9: 2
1. LITTLE INTEREST OR PLEASURE IN DOING THINGS: NOT AT ALL
2. FEELING DOWN, DEPRESSED OR HOPELESS: MORE THAN HALF THE DAYS
SUM OF ALL RESPONSES TO PHQ9 QUESTIONS 1 & 2: 2
SUM OF ALL RESPONSES TO PHQ QUESTIONS 1-9: 2
SUM OF ALL RESPONSES TO PHQ QUESTIONS 1-9: 2

## 2025-01-27 NOTE — PROGRESS NOTES
Cancer [place] at [facility]  [address], [address] [facility] [address]  W: [phone]  F: [phone]      Reason for Visit:   Lizabeth Billings is a 69 y.o. female who is seen in follow up for breast cancer.    Breast Surgeon: Dr. Contreras    Treatment History:   1/23/24 left breast mass core bx:  IDC, gr 2, 12 mm, ER + at 100%, [place] + at 100%, HER 2 negative (IHC2+, DISH ratio 1.4; sig/cell 2.1), ki67 8%  2/7/24 Left breast tissue lumpectomy:  IDC, gr 2, 2 cm, 0/2 LN, no LVI, pT1c pN0 cM0  Oncotype Dx = 6, RR 3%  Anastrozole 3/4/24 - 5/13/24 (joint pain, fatigue)  Exemestane 5/24/24 - 6/24/24 (fatigue, depression); 7/8/24-8/29/24  Tamoxifen 9/9/24-12/16/24 (joint aches)  Letrozole 1/28/25-    History of Present Illness:   An abnormal mammogram led to the pathology above    Interval Hx: Patient presents today for follow up.  Reports gr 2 loss of appetite, gr 1 constipation, gr 1-2 fatigue, gr 1 nausea, gr 2 hot flashes, gr 2 insomnia, gr 2 loss of cognition, gr 2 loss of concentration, gr 1 anxiety, gr 2 pain, gr 1 cough, gr 2 sob, gr 1 itching, gr 1 headache, gr 1 urinary freq, gr 1 vaginal dryness        FH:  MGGM with breast cancer at age 47    Past Medical History:   Diagnosis Date    Atypical ductal hyperplasia of breast 2012    Breast cancer (HCC)     Colitis     Depression with anxiety 8/8/2016    GERD (gastroesophageal reflux disease) past week    reflux only occasionally    History of migraine     age 20's    Hormone replacement therapy     Last Dose 1/2024    Hx of bone density study 10/19/18, 2008    Normal, repeat 10 years    Hypertension     IBS (irritable bowel syndrome)     Lichen sclerosus et atrophicus of the vulva 1995    to [facility]/symptom free now (2024)    Menopausal syndrome     Neck pain, chronic     Other ill-defined conditions(449.89)     old neck injury/pain comes & goes    PONV (postoperative nausea and vomiting)     Seasonal allergies

## 2025-01-27 NOTE — PROGRESS NOTES
Lizabeth Billings is a 69 y.o. female is here today for a breast cancer follow up.    1. Have you been to the ER, urgent care clinic since your last visit?  Hospitalized since your last visit?No    2. Have you seen or consulted any other health care providers outside of the [facility] since your last visit?  Include any pap smears or colon screening. No

## 2025-02-07 ENCOUNTER — TELEPHONE (OUTPATIENT)
Age: 70
End: 2025-02-07

## 2025-02-07 ENCOUNTER — OFFICE VISIT (OUTPATIENT)
Age: 70
End: 2025-02-07
Payer: MEDICARE

## 2025-02-07 ENCOUNTER — ANCILLARY PROCEDURE (OUTPATIENT)
Age: 70
End: 2025-02-07
Payer: MEDICARE

## 2025-02-07 VITALS
WEIGHT: 148 LBS | SYSTOLIC BLOOD PRESSURE: 117 MMHG | OXYGEN SATURATION: 97 % | TEMPERATURE: 98 F | DIASTOLIC BLOOD PRESSURE: 76 MMHG | HEART RATE: 99 BPM | BODY MASS INDEX: 23.18 KG/M2 | RESPIRATION RATE: 18 BRPM

## 2025-02-07 DIAGNOSIS — G90.9 ANS (AUTONOMIC NERVOUS SYSTEM) DISEASE: ICD-10-CM

## 2025-02-07 DIAGNOSIS — I95.1 ORTHOSTASIS: ICD-10-CM

## 2025-02-07 DIAGNOSIS — R90.89 ABNORMAL FINDING ON MRI OF BRAIN: Primary | ICD-10-CM

## 2025-02-07 DIAGNOSIS — R42 DIZZY: Primary | ICD-10-CM

## 2025-02-07 DIAGNOSIS — R90.89 ABNORMAL FINDING ON MRI OF BRAIN: ICD-10-CM

## 2025-02-07 DIAGNOSIS — R42 DIZZY: ICD-10-CM

## 2025-02-07 PROCEDURE — 93880 EXTRACRANIAL BILAT STUDY: CPT | Performed by: PSYCHIATRY & NEUROLOGY

## 2025-02-07 PROCEDURE — 99204 OFFICE O/P NEW MOD 45 MIN: CPT | Performed by: PSYCHIATRY & NEUROLOGY

## 2025-02-07 PROCEDURE — 95816 EEG AWAKE AND DROWSY: CPT | Performed by: PSYCHIATRY & NEUROLOGY

## 2025-02-07 RX ORDER — HYDROXYZINE PAMOATE 100 MG
100 CAPSULE ORAL
COMMUNITY

## 2025-02-07 ASSESSMENT — PATIENT HEALTH QUESTIONNAIRE - PHQ9
1. LITTLE INTEREST OR PLEASURE IN DOING THINGS: NOT AT ALL
SUM OF ALL RESPONSES TO PHQ QUESTIONS 1-9: 0
SUM OF ALL RESPONSES TO PHQ QUESTIONS 1-9: 0
2. FEELING DOWN, DEPRESSED OR HOPELESS: NOT AT ALL
SUM OF ALL RESPONSES TO PHQ QUESTIONS 1-9: 0
SUM OF ALL RESPONSES TO PHQ9 QUESTIONS 1 & 2: 0
SUM OF ALL RESPONSES TO PHQ QUESTIONS 1-9: 0

## 2025-02-07 NOTE — PATIENT INSTRUCTIONS
PRIOR AUTHORIZATION FOR MEDICATIONS    If you were prescribed medication during your visit, it may require a prior authorization which is a determination of the medication coverage made by your insurance plan.     This process can take 14 business days for most medications prescribed by our Neurologists.      Botox injections (for therapeutic use) can take up to 8 weeks.    If you haven't heard from our office or your pharmacy within the time outlined above, please contact our office.        [place], [place] Neuroscience Test Result Communication    Test results are available in [employer].  [employer] is the patient portal into our electronic health record.  This feature allows patients to see diagnostic test results, immunizations, allergies, past medical and surgical history, current medications, and send messages directly to providers.  Our team members at the  can provide additional information and assist with registration.  The [employer] support team can be reached at [phone].    In some cases, a provider might need time to explain the results in detail during a follow-up appointment.  This might include additional information or context that will help patients understand the reason for next steps in the plan of care recommended by their provider.    If a patient chooses to receive diagnostic testing at an imaging center outside of the [facility] network, it is the patient's responsibility to bring the imaging report and disc to their [facility] follow-up appointment.    If the test results reveal anything that is particularly noteworthy, we will contact you to discuss the matter and, if necessary, schedule a follow-up appointment at an earlier date.    If you have not received your test results by [employer] or other communication within 7 days, please contact our office.  An inquiry can be sent to your provider using [employer].  Alternatively, appointments can be scheduled via

## 2025-02-07 NOTE — TELEPHONE ENCOUNTER
No pa required for ans testing [id number]  Patient has medicare    Hold prior to testing   Hydroxyzine-3 days  Fexofenadine-3 days  Omeprazole-3 days  Bupropion-5 days  Sertraline-5 days

## 2025-02-07 NOTE — PROGRESS NOTES
[facility] at [facility] at [address] [place] [address]  [address] [place] [address] [phone] Office  [phone] Facsimile           Referring: Macario Chase MD  [id number]  [address]  [place] [address]     Chief Complaint   Patient presents with    New Patient     Presents with  Matt Lynn today    Dizziness     1st vertigo episode occurred July 2024, lasted about 2 days.  Significant HA with it.  Saw ENT who did epley, non effective.  The vertigo has seemed to subsided but will have dizziness daily when changing positions.    Balance may be very wobbly, almost like she is drunk    discuss MRI report     History of Present Illness  The patient is a 69-year-old lady who presents for initial neurologic consultation regarding dizziness.    She has been experiencing persistent dizziness, which initially manifested as severe vertigo in the summer of last year. This episode led to hospitalization due to dehydration, as she was bedridden for two days without adequate fluid intake. During her 8-hour hospital stay, she underwent a CT scan of her head and received intravenous fluids. Following discharge, she remained in bed for an additional four days. The initial vertigo episode was accompanied by nausea and a sensation akin to a migraine, although she does not typically suffer from migraines. She consulted an ENT specialist twice, who performed Epley maneuver in 10/2024, but this intervention did not alleviate her symptoms. Her current dizziness is less severe than the initial vertigo, and it is not vertigo but lightheadedness.  Sometimes she feels imbalance.  Symptoms occur with position change such as rising from a seated position or getting out of bed. She experiences lightheadedness upon waking each morning but reports no episodes of

## 2025-02-10 ENCOUNTER — TELEPHONE (OUTPATIENT)
Age: 70
End: 2025-02-10

## 2025-02-10 NOTE — TELEPHONE ENCOUNTER
Requesting a call back concerning possible r/s for test on 02/11/25 due to taking medications up to date.

## 2025-02-11 NOTE — TELEPHONE ENCOUNTER
Pt had eeg scheduled today however, VM was left to r/s d/t weather.  There is no medication changes that the EEG requires.  If she is referring to the ANS testing, that is scheduled for 3/20/25

## 2025-02-26 ENCOUNTER — OFFICE VISIT (OUTPATIENT)
Age: 70
End: 2025-02-26
Payer: MEDICARE

## 2025-02-26 VITALS
RESPIRATION RATE: 18 BRPM | DIASTOLIC BLOOD PRESSURE: 81 MMHG | BODY MASS INDEX: 28.25 KG/M2 | TEMPERATURE: 98.4 F | HEART RATE: 86 BPM | HEIGHT: 67 IN | SYSTOLIC BLOOD PRESSURE: 128 MMHG | OXYGEN SATURATION: 98 % | WEIGHT: 180 LBS

## 2025-02-26 DIAGNOSIS — C50.212 MALIGNANT NEOPLASM OF UPPER-INNER QUADRANT OF LEFT BREAST IN FEMALE, ESTROGEN RECEPTOR POSITIVE (HCC): Primary | ICD-10-CM

## 2025-02-26 DIAGNOSIS — Z17.0 MALIGNANT NEOPLASM OF UPPER-INNER QUADRANT OF LEFT BREAST IN FEMALE, ESTROGEN RECEPTOR POSITIVE (HCC): Primary | ICD-10-CM

## 2025-02-26 PROCEDURE — 1036F TOBACCO NON-USER: CPT | Performed by: INTERNAL MEDICINE

## 2025-02-26 PROCEDURE — 3017F COLORECTAL CA SCREEN DOC REV: CPT | Performed by: INTERNAL MEDICINE

## 2025-02-26 PROCEDURE — 99214 OFFICE O/P EST MOD 30 MIN: CPT | Performed by: INTERNAL MEDICINE

## 2025-02-26 ASSESSMENT — PATIENT HEALTH QUESTIONNAIRE - PHQ9
SUM OF ALL RESPONSES TO PHQ QUESTIONS 1-9: 2
1. LITTLE INTEREST OR PLEASURE IN DOING THINGS: NOT AT ALL
SUM OF ALL RESPONSES TO PHQ9 QUESTIONS 1 & 2: 2
2. FEELING DOWN, DEPRESSED OR HOPELESS: MORE THAN HALF THE DAYS

## 2025-02-26 NOTE — PROGRESS NOTES
Lizabeth Billings is a 69 y.o. female is here today for a breast cancer follow up.    1. Have you been to the ER, urgent care clinic since your last visit?  Hospitalized since your last visit?No    2. Have you seen or consulted any other health care providers outside of the [facility] since your last visit?  Include any pap smears or colon screening. No      
15-23%; her median OS is 17-21 years. An adjuvant discussion is warranted.    Low oncotype, no indication for chemo    Discussed the MICA study, which would randomize her to receive XRT adjuvantly or observation.  She declines as she declines XRT.  This is reasonable as per PRIME II    After discussion, she was agreeable to anastrozole 1 mg daily. She reports starting on 3/4/24 but stopped 5/13/24 due to worsened joint pain and fatigue. Switched to exemestane with some worsened fatigue and anxiety.    The risks and benefits of tamoxifen were discussed in detail and the patient was informed of the following: Risks include a 1% risk of endometrial cancer for postmenopausal women treated for five years but no (or a minimally increased) risk in premenopausal women and that most women who develop tamoxifen-associated endometrial cancer can be cured. Any bleeding in a postmenopausal woman should be reported to a health care professional. There is also a 1% risk of blood clots (thromboembolism) that can be fatal. All patients irrespective of age who take tamoxifen and who have not had a hysterectomy should have a pelvic exam and Pap smear yearly. Tamoxifen increases the risk of cataract formation and on rare occasions has caused retinal damage: an eye exam is recommended yearly. Other risks include vaginal discharge or dryness, the development or worsening of hot flashes or vasomotor symptoms, and bone loss in premenopausal women. There is excellent evidence that tamoxifen does not increase risk of depression, cause weight gain or have a major effect on sexual function. Available data suggests little or no effect on cognitive function. Benefits include a lowering of cholesterol and a reduction in the rate of bone loss for postmenopausal woman. Any other symptoms should be reported.    Also, discussed letrozole    After this discussion, she was agreeable to tamoxifen 20 mg PO daily.  Rx in.  No clinically relevant

## 2025-03-10 ENCOUNTER — HOSPITAL ENCOUNTER (OUTPATIENT)
Facility: HOSPITAL | Age: 70
Discharge: HOME | End: 2025-03-13
Attending: PSYCHIATRY & NEUROLOGY
Payer: MEDICARE

## 2025-03-10 DIAGNOSIS — R42 DIZZY: ICD-10-CM

## 2025-03-10 DIAGNOSIS — R90.89 ABNORMAL FINDING ON MRI OF BRAIN: ICD-10-CM

## 2025-03-10 DIAGNOSIS — I95.1 ORTHOSTASIS: ICD-10-CM

## 2025-03-10 DIAGNOSIS — G90.9 ANS (AUTONOMIC NERVOUS SYSTEM) DISEASE: ICD-10-CM

## 2025-03-10 PROCEDURE — 70496 CT ANGIOGRAPHY HEAD: CPT

## 2025-03-10 PROCEDURE — 82565 ASSAY OF CREATININE: CPT

## 2025-03-10 PROCEDURE — 6360000004 HC RX CONTRAST MEDICATION: Performed by: PSYCHIATRY & NEUROLOGY

## 2025-03-10 RX ORDER — IOPAMIDOL 755 MG/ML
100 INJECTION, SOLUTION INTRAVASCULAR
Status: COMPLETED | OUTPATIENT
Start: 2025-03-10 | End: 2025-03-10

## 2025-03-10 RX ADMIN — IOPAMIDOL 85 ML: 755 INJECTION, SOLUTION INTRAVENOUS at 19:44

## 2025-03-11 LAB — CREAT BLD-MCNC: 1.1 MG/DL (ref 0.6–1.3)

## 2025-03-20 ENCOUNTER — TELEPHONE (OUTPATIENT)
Age: 70
End: 2025-03-20

## 2025-03-20 NOTE — TELEPHONE ENCOUNTER
Called and let the patient and her  know that once Lizabeth called with new appointment to reach out to Yanique and she would call and discuss next steps with patient and her .

## 2025-03-20 NOTE — TELEPHONE ENCOUNTER
Please contact spouse (Manpreet) to RS ANS testing. Spouse is asking that they be given priority for the appt. B/c they have been waiting, and stated that his wife is falling, and needs help now.  Thanks.

## 2025-03-28 ENCOUNTER — PROCEDURE VISIT (OUTPATIENT)
Age: 70
End: 2025-03-28
Payer: MEDICARE

## 2025-03-28 DIAGNOSIS — R42 DIZZY: Primary | ICD-10-CM

## 2025-03-28 PROCEDURE — 95924 ANS PARASYMP & SYMP W/TILT: CPT | Performed by: PSYCHIATRY & NEUROLOGY

## 2025-03-28 PROCEDURE — 95923 AUTONOMIC NRV SYST FUNJ TEST: CPT | Performed by: PSYCHIATRY & NEUROLOGY

## 2025-03-28 NOTE — PROGRESS NOTES
[facility]  [address], [address]  [place] [address]  Phone: [phone]  FAX: [phone]    Patient ID:  Lizabeth Billings  [id number]  69 y.o.  1955     REFERRED BY: Felipa Santos MD  PCP: Beth White APRN - NP    Date of Testin2025       Indication/History:    Episodes of dizziness and nausea (+) Breast CA    Patient is coming for syncope/autonomic dysfunction evaluation.    Medications taken 48 hrs before the test: Allegra (Fexofenadine), ASA     Procedure: This Autonomic Nervous System (ANS) testing is performed by utilizing [employer] [facility] Work Autonomic System, with established protocol.  Multiple procedures performed: Heart rate response to deep breathing (HRDB), Valsalva ratio, Heart rate and BP response to head up tilt (HUT), and Quantitative sudomotor axon reflex testing (QSWEAT) .     Result:  Heart response to deep  breathing (HRDB): 2 series of 6 cycles were performed and the mean of 6 consecutive cycles was obtained. Average HR difference was 5.8 and E:I ratio was 1.09.    Heart rate response to Valsalva maneuver:  [id number] BP to Valsalva was measured and BP response in all 4 phases was normal. Heart rate response was the opposite of BP, a normal response. ( VR = 1.25 )  HUT (head-up tilt) : [id number] BP and HR were measured, up to 15 minutes post tilt.  No significant BP reduction or HR acceleration/deceleration.  SUDOMOTOR: QSWEAT response showed reduced sweat production in forearm, proximal leg and foot of the right upper and lower limbs, comparing patient to age group.     Impression:     Reduced sweat production in forearm, proximal leg and foot which can be seen in small fiber neuropathy.    No evidence of orthostatic hypotension or significant tachycardia noted.         Ramon Veronica MD  Diplomate, American Board of Psychiatry and Neurology  Diplomate, Neuromuscular Medicine  Diplomate, American Board of Electrodiagnostic

## 2025-04-02 SDOH — ECONOMIC STABILITY: FOOD INSECURITY: WITHIN THE PAST 12 MONTHS, YOU WORRIED THAT YOUR FOOD WOULD RUN OUT BEFORE YOU GOT MONEY TO BUY MORE.: NEVER TRUE

## 2025-04-02 SDOH — ECONOMIC STABILITY: INCOME INSECURITY: IN THE LAST 12 MONTHS, WAS THERE A TIME WHEN YOU WERE NOT ABLE TO PAY THE MORTGAGE OR RENT ON TIME?: NO

## 2025-04-02 SDOH — ECONOMIC STABILITY: FOOD INSECURITY: WITHIN THE PAST 12 MONTHS, THE FOOD YOU BOUGHT JUST DIDN'T LAST AND YOU DIDN'T HAVE MONEY TO GET MORE.: NEVER TRUE

## 2025-04-02 SDOH — ECONOMIC STABILITY: TRANSPORTATION INSECURITY
IN THE PAST 12 MONTHS, HAS THE LACK OF TRANSPORTATION KEPT YOU FROM MEDICAL APPOINTMENTS OR FROM GETTING MEDICATIONS?: NO

## 2025-04-02 SDOH — ECONOMIC STABILITY: TRANSPORTATION INSECURITY
IN THE PAST 12 MONTHS, HAS LACK OF TRANSPORTATION KEPT YOU FROM MEETINGS, WORK, OR FROM GETTING THINGS NEEDED FOR DAILY LIVING?: NO

## 2025-04-03 ENCOUNTER — PATIENT MESSAGE (OUTPATIENT)
Age: 70
End: 2025-04-03

## 2025-04-03 DIAGNOSIS — R42 DIZZY: Primary | ICD-10-CM

## 2025-04-03 DIAGNOSIS — I95.1 ORTHOSTASIS: ICD-10-CM

## 2025-04-09 NOTE — PROGRESS NOTES
Lizabeth Billings is a 69 y.o. female presents for a problem visit.    No chief complaint on file.    No LMP recorded. Patient is postmenopausal.  Birth Control: post menopausal status.  Last Pap: normal obtained 4 year(s) ago.        1. Have you been to the ER, urgent care clinic, or hospitalized since your last visit? Yes 3/2025 surgery    2. Have you seen or consulted any other health care providers outside of the [facility] since your last visit? No    Examination chaperoned by Torrie Crouch LPN.

## 2025-04-15 SDOH — ECONOMIC STABILITY: INCOME INSECURITY: IN THE LAST 12 MONTHS, WAS THERE A TIME WHEN YOU WERE NOT ABLE TO PAY THE MORTGAGE OR RENT ON TIME?: NO

## 2025-04-15 SDOH — ECONOMIC STABILITY: FOOD INSECURITY: WITHIN THE PAST 12 MONTHS, YOU WORRIED THAT YOUR FOOD WOULD RUN OUT BEFORE YOU GOT MONEY TO BUY MORE.: NEVER TRUE

## 2025-04-15 SDOH — ECONOMIC STABILITY: FOOD INSECURITY: WITHIN THE PAST 12 MONTHS, THE FOOD YOU BOUGHT JUST DIDN'T LAST AND YOU DIDN'T HAVE MONEY TO GET MORE.: NEVER TRUE

## 2025-04-17 ENCOUNTER — OFFICE VISIT (OUTPATIENT)
Age: 70
End: 2025-04-17
Payer: MEDICARE

## 2025-04-17 VITALS
RESPIRATION RATE: 16 BRPM | DIASTOLIC BLOOD PRESSURE: 71 MMHG | OXYGEN SATURATION: 96 % | SYSTOLIC BLOOD PRESSURE: 113 MMHG | TEMPERATURE: 98.9 F | HEIGHT: 67 IN | BODY MASS INDEX: 27.65 KG/M2 | WEIGHT: 176.2 LBS | HEART RATE: 83 BPM

## 2025-04-17 DIAGNOSIS — Z01.419 ENCOUNTER FOR GYNECOLOGICAL EXAMINATION (GENERAL) (ROUTINE) WITHOUT ABNORMAL FINDINGS: Primary | ICD-10-CM

## 2025-04-17 PROCEDURE — 1126F AMNT PAIN NOTED NONE PRSNT: CPT | Performed by: OBSTETRICS & GYNECOLOGY

## 2025-04-17 PROCEDURE — 1159F MED LIST DOCD IN RCRD: CPT | Performed by: OBSTETRICS & GYNECOLOGY

## 2025-04-17 PROCEDURE — 3078F DIAST BP <80 MM HG: CPT | Performed by: OBSTETRICS & GYNECOLOGY

## 2025-04-17 PROCEDURE — G0101 CA SCREEN;PELVIC/BREAST EXAM: HCPCS | Performed by: OBSTETRICS & GYNECOLOGY

## 2025-04-17 PROCEDURE — 3074F SYST BP LT 130 MM HG: CPT | Performed by: OBSTETRICS & GYNECOLOGY

## 2025-04-17 PROCEDURE — 1160F RVW MEDS BY RX/DR IN RCRD: CPT | Performed by: OBSTETRICS & GYNECOLOGY

## 2025-04-17 NOTE — PROGRESS NOTES
Lizabeth Billings is a 69 y.o. female returns for an annual exam     Chief Complaint   Patient presents with    Annual Exam       No LMP recorded. Patient is postmenopausal.  Problems: no problems  Birth Control: post menopausal status.  Last Pap: normal obtained 4 year(s) ago.  She does not have a history of MONTSERRAT 2, 3 or cervical cancer.   Last Mammogram: has not had a recent mammogram.  It was see report.   Last Bone Density: abnormal obtained 2 year(s) ago.  Last colonoscopy: normal obtained 2 year(s) ago.      1. Have you been to the ER, urgent care clinic, or hospitalized since your last visit? No    2. Have you seen or consulted any other health care providers outside of the [facility] since your last visit? No    Examination chaperoned by Torrie Crouch LPN.

## 2025-04-17 NOTE — PROGRESS NOTES
Annual exam post menopause      Lizabeth Billings is a ,  69 y.o. female   No LMP recorded. Patient is postmenopausal.    She presents for her annual checkup.     She is having no problems.    Menstrual status:  The patient is not having menses.    Hormonal status:  She reports no perimenstrual type symptoms.   She is not having vasomotor symptoms.  The patient is not using any HRT.     Sexual history:    She  has no history on file for sexual activity.    Per Nursing Note:  Last Pap: normal obtained 4 year(s) ago.  She does not have a history of MONTSERRAT 2, 3 or cervical cancer.   Last Mammogram: has not had a recent mammogram.  It was see report.   Last Bone Density: abnormal obtained 2 year(s) ago.  Last colonoscopy: normal obtained 2 year(s) ago.    Past Medical History:   Diagnosis Date    Atypical ductal hyperplasia of breast     Breast cancer (HCC)     Colitis     Depression with anxiety 2016    GERD (gastroesophageal reflux disease) past week    reflux only occasionally    History of migraine     age 20's    Hormone replacement therapy     Last Dose 2024    Hx of bone density study 10/19/18, 2008    Normal, repeat 10 years    Hypertension     IBS (irritable bowel syndrome)     Lichen sclerosus et atrophicus of the vulva     to [facility]/symptom free now ()    Menopausal syndrome     Neck pain, chronic     Other ill-defined conditions(799.89)     old neck injury/pain comes & goes    PONV (postoperative nausea and vomiting)     Seasonal allergies     Unspecified adverse effect of anesthesia     C/O \"waking up\" during one SX    Urinary frequency      Past Surgical History:   Procedure Laterality Date    BIOPSY OF BREAST, INCISIONAL  2012    RIGHT for ADH    BREAST BIOPSY Right 2012    US guided-Atypical Ductal Hyperplasia    BREAST BIOPSY Right 2011    US guided-Stromal fibrosis w/benign mammary ducts and lobules    BREAST BIOPSY Right 2012    Surgical

## 2025-05-09 ENCOUNTER — TELEPHONE (OUTPATIENT)
Age: 70
End: 2025-05-09

## 2025-05-09 NOTE — TELEPHONE ENCOUNTER
Patient called and stated that she needed to reschedule her appt due to her having a conflict. Patient rescheduled to 5/29 at 10:30

## 2025-05-21 ENCOUNTER — TRANSCRIBE ORDERS (OUTPATIENT)
Facility: HOSPITAL | Age: 70
End: 2025-05-21

## 2025-05-21 DIAGNOSIS — M47.812 CERVICAL SPONDYLOSIS: ICD-10-CM

## 2025-05-21 DIAGNOSIS — M50.30 DDD (DEGENERATIVE DISC DISEASE), CERVICAL: ICD-10-CM

## 2025-05-21 DIAGNOSIS — M54.12 RADICULOPATHY OF CERVICAL REGION: Primary | ICD-10-CM

## 2025-05-28 ENCOUNTER — HOSPITAL ENCOUNTER (OUTPATIENT)
Facility: HOSPITAL | Age: 70
Discharge: HOME | End: 2025-05-31
Attending: PHYSICAL MEDICINE & REHABILITATION
Payer: MEDICARE

## 2025-05-28 DIAGNOSIS — M54.12 RADICULOPATHY OF CERVICAL REGION: ICD-10-CM

## 2025-05-28 DIAGNOSIS — M47.812 CERVICAL SPONDYLOSIS: ICD-10-CM

## 2025-05-28 DIAGNOSIS — M50.30 DDD (DEGENERATIVE DISC DISEASE), CERVICAL: ICD-10-CM

## 2025-05-28 PROCEDURE — 72141 MRI NECK SPINE W/O DYE: CPT

## 2025-05-28 RX ORDER — LETROZOLE 2.5 MG/1
2.5 TABLET, FILM COATED ORAL DAILY
Qty: 30 TABLET | Refills: 3 | Status: SHIPPED | OUTPATIENT
Start: 2025-05-28

## 2025-05-29 ENCOUNTER — TELEPHONE (OUTPATIENT)
Age: 70
End: 2025-05-29

## 2025-05-29 ENCOUNTER — OFFICE VISIT (OUTPATIENT)
Age: 70
End: 2025-05-29
Payer: MEDICARE

## 2025-05-29 VITALS
OXYGEN SATURATION: 96 % | BODY MASS INDEX: 27.94 KG/M2 | HEART RATE: 82 BPM | HEIGHT: 67 IN | TEMPERATURE: 98.4 F | WEIGHT: 178 LBS | RESPIRATION RATE: 18 BRPM | DIASTOLIC BLOOD PRESSURE: 66 MMHG | SYSTOLIC BLOOD PRESSURE: 113 MMHG

## 2025-05-29 DIAGNOSIS — C50.212 MALIGNANT NEOPLASM OF UPPER-INNER QUADRANT OF LEFT BREAST IN FEMALE, ESTROGEN RECEPTOR POSITIVE (HCC): Primary | ICD-10-CM

## 2025-05-29 DIAGNOSIS — Z17.0 MALIGNANT NEOPLASM OF UPPER-INNER QUADRANT OF LEFT BREAST IN FEMALE, ESTROGEN RECEPTOR POSITIVE (HCC): Primary | ICD-10-CM

## 2025-05-29 DIAGNOSIS — R53.83 OTHER FATIGUE: ICD-10-CM

## 2025-05-29 PROCEDURE — 3074F SYST BP LT 130 MM HG: CPT | Performed by: NURSE PRACTITIONER

## 2025-05-29 PROCEDURE — 1090F PRES/ABSN URINE INCON ASSESS: CPT | Performed by: NURSE PRACTITIONER

## 2025-05-29 PROCEDURE — 1036F TOBACCO NON-USER: CPT | Performed by: NURSE PRACTITIONER

## 2025-05-29 PROCEDURE — 3078F DIAST BP <80 MM HG: CPT | Performed by: NURSE PRACTITIONER

## 2025-05-29 PROCEDURE — G8419 CALC BMI OUT NRM PARAM NOF/U: HCPCS | Performed by: NURSE PRACTITIONER

## 2025-05-29 PROCEDURE — 1123F ACP DISCUSS/DSCN MKR DOCD: CPT | Performed by: NURSE PRACTITIONER

## 2025-05-29 PROCEDURE — 99214 OFFICE O/P EST MOD 30 MIN: CPT | Performed by: NURSE PRACTITIONER

## 2025-05-29 PROCEDURE — 1126F AMNT PAIN NOTED NONE PRSNT: CPT | Performed by: NURSE PRACTITIONER

## 2025-05-29 PROCEDURE — 3017F COLORECTAL CA SCREEN DOC REV: CPT | Performed by: NURSE PRACTITIONER

## 2025-05-29 PROCEDURE — 1159F MED LIST DOCD IN RCRD: CPT | Performed by: NURSE PRACTITIONER

## 2025-05-29 PROCEDURE — G8399 PT W/DXA RESULTS DOCUMENT: HCPCS | Performed by: NURSE PRACTITIONER

## 2025-05-29 PROCEDURE — G8427 DOCREV CUR MEDS BY ELIG CLIN: HCPCS | Performed by: NURSE PRACTITIONER

## 2025-05-29 RX ORDER — ASPIRIN 81 MG/1
TABLET, CHEWABLE ORAL
COMMUNITY

## 2025-05-29 ASSESSMENT — PATIENT HEALTH QUESTIONNAIRE - PHQ9
2. FEELING DOWN, DEPRESSED OR HOPELESS: NOT AT ALL
1. LITTLE INTEREST OR PLEASURE IN DOING THINGS: NOT AT ALL
SUM OF ALL RESPONSES TO PHQ QUESTIONS 1-9: 0

## 2025-05-29 NOTE — TELEPHONE ENCOUNTER
Lvm to get patient set up for a Return in about 5 months (around 10/29/2025) for roseanne/LAWANDA king follow up.

## 2025-05-29 NOTE — PROGRESS NOTES
Lizabeth Billings is a 70 y.o. female is here today for a follow up.    1. Have you been to the ER, urgent care clinic since your last visit?  Hospitalized since your last visit?No    2. Have you seen or consulted any other health care providers outside of the [facility] since your last visit?  Include any pap smears or colon screening. No    
nerve  displacement. The left vertebral artery is patent. 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  Otherwise;  There is no abnormal parenchymal enhancement. There is no abnormal meningeal  enhancement demonstrated. The brain architecture is normal. There is no evidence  of midline shift or mass-effect. The ventricles are normal in size, position and  configuration.  There are no extra-axial fluid collections. Major intracranial  vascular flow-voids are unremarkable. The orbits are grossly symmetric. Dural  venous sinuses are grossly unremarkable. There is no Chiari or syrinx. Pituitary  and infundibulum grossly unremarkable. Cerebellopontine angles are unremarkable.  No skull base mass is identified. Cavernous sinuses are symmetric. The mastoid  air cells and are well pneumatized and clear.     IMPRESSION:     No acute intracranial process.  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     Minimal chronic microvascular ischemic change.  Small chronic infarct in the right corona radiata.  No intracranial mass, hemorrhage or evidence of acute infarction.             Records reviewed and summarized above.  Pathology report(s) reviewed above.  Radiology report(s) reviewed above.      Assessment/plan:   1. 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 We explained why a patient can have microscopic cancer spread now even though physical examination, laboratory studies and imaging studies are negative for cancer. We explained that the same treatments used now as adjuvant or preventive treatments rarely if ever are curative in women who develop metastases.     Using eprognosis.org, her 5 year all cause mortality risk is

## 2025-06-03 ENCOUNTER — TELEPHONE (OUTPATIENT)
Age: 70
End: 2025-06-03

## 2025-06-26 ENCOUNTER — TELEPHONE (OUTPATIENT)
Age: 70
End: 2025-06-26

## 2025-06-30 NOTE — TELEPHONE ENCOUNTER
Summer,    Can you call this patient when you can she has called back. I advised her that you're aware. Thanks. :)

## (undated) DEVICE — BLUNTFILL WITH FILTER: Brand: MONOJECT

## (undated) DEVICE — PENCIL ES CRD L10FT HND SWCHING ROCK SWCH W/ EDGE COAT BLDE

## (undated) DEVICE — GLOVE SURG SZ 65 THK91MIL LTX FREE SYN POLYISOPRENE

## (undated) DEVICE — SOLUTION IRRIG 500ML 0.9% SOD CHLO USP POUR PLAS BTL

## (undated) DEVICE — STRIP,CLOSURE,WOUND,MEDI-STRIP,1/2X4: Brand: MEDLINE

## (undated) DEVICE — SUTURE PERMA-HAND SZ 2-0 L30IN NONABSORBABLE BLK L26MM SH K833H

## (undated) DEVICE — CHEST PACK-SFMCASU: Brand: MEDLINE INDUSTRIES, INC.

## (undated) DEVICE — SOLUTION IRRIG 1000ML STRL H2O USP PLAS POUR BTL

## (undated) DEVICE — COVER US PRB W15XL120CM W/ GEL RUBBERBAND TAPE STRP FLD GEN

## (undated) DEVICE — HYPODERMIC SAFETY NEEDLE: Brand: MONOJECT

## (undated) DEVICE — AGENT HEMSTAT 3GM OXIDIZED REGENERATED CELOS ABSRB FOR CONT (ORDER MULTIPLES OF 5EA)

## (undated) DEVICE — SYRINGE MED 10ML LUERLOCK TIP W/O SFTY DISP

## (undated) DEVICE — CANISTER, RIGID, 3000CC: Brand: MEDLINE INDUSTRIES, INC.

## (undated) DEVICE — SYRINGE FLSH IV STD 10 CC W/O CANN PREFILLED NACL POSIFLUSH 306546